# Patient Record
Sex: MALE | Race: BLACK OR AFRICAN AMERICAN | Employment: OTHER | URBAN - METROPOLITAN AREA
[De-identification: names, ages, dates, MRNs, and addresses within clinical notes are randomized per-mention and may not be internally consistent; named-entity substitution may affect disease eponyms.]

---

## 2018-01-19 ENCOUNTER — HOSPITAL ENCOUNTER (EMERGENCY)
Age: 63
Discharge: HOME | End: 2018-01-19
Attending: STUDENT IN AN ORGANIZED HEALTH CARE EDUCATION/TRAINING PROGRAM | Admitting: STUDENT IN AN ORGANIZED HEALTH CARE EDUCATION/TRAINING PROGRAM
Payer: MEDICARE

## 2018-01-19 ENCOUNTER — APPOINTMENT (OUTPATIENT)
Dept: CT IMAGING | Age: 63
End: 2018-01-19
Attending: STUDENT IN AN ORGANIZED HEALTH CARE EDUCATION/TRAINING PROGRAM
Payer: MEDICARE

## 2018-01-19 VITALS
HEART RATE: 92 BPM | HEIGHT: 68 IN | DIASTOLIC BLOOD PRESSURE: 99 MMHG | SYSTOLIC BLOOD PRESSURE: 141 MMHG | RESPIRATION RATE: 16 BRPM | OXYGEN SATURATION: 95 % | BODY MASS INDEX: 27.16 KG/M2 | WEIGHT: 179.2 LBS | TEMPERATURE: 97.6 F

## 2018-01-19 DIAGNOSIS — F32.A DEPRESSION, UNSPECIFIED DEPRESSION TYPE: Primary | ICD-10-CM

## 2018-01-19 LAB
ALBUMIN SERPL-MCNC: 3.6 G/DL (ref 3.5–5)
ALBUMIN/GLOB SERPL: 1.1 {RATIO} (ref 1.1–2.2)
ALP SERPL-CCNC: 63 U/L (ref 45–117)
ALT SERPL-CCNC: 31 U/L (ref 12–78)
AMPHET UR QL SCN: NEGATIVE
ANION GAP SERPL CALC-SCNC: 7 MMOL/L (ref 5–15)
APAP SERPL-MCNC: <2 UG/ML (ref 10–30)
APPEARANCE UR: CLEAR
AST SERPL-CCNC: 18 U/L (ref 15–37)
BACTERIA URNS QL MICRO: NEGATIVE /HPF
BARBITURATES UR QL SCN: NEGATIVE
BASOPHILS # BLD: 0 K/UL (ref 0–0.1)
BASOPHILS NFR BLD: 1 % (ref 0–1)
BENZODIAZ UR QL: NEGATIVE
BILIRUB SERPL-MCNC: 0.4 MG/DL (ref 0.2–1)
BILIRUB UR QL: NEGATIVE
BUN SERPL-MCNC: 6 MG/DL (ref 6–20)
BUN/CREAT SERPL: 7 (ref 12–20)
CALCIUM SERPL-MCNC: 8.9 MG/DL (ref 8.5–10.1)
CANNABINOIDS UR QL SCN: NEGATIVE
CHLORIDE SERPL-SCNC: 106 MMOL/L (ref 97–108)
CO2 SERPL-SCNC: 26 MMOL/L (ref 21–32)
COCAINE UR QL SCN: NEGATIVE
COLOR UR: NORMAL
CREAT SERPL-MCNC: 0.81 MG/DL (ref 0.7–1.3)
DRUG SCRN COMMENT,DRGCM: NORMAL
EOSINOPHIL # BLD: 0.1 K/UL (ref 0–0.4)
EOSINOPHIL NFR BLD: 1 % (ref 0–7)
EPITH CASTS URNS QL MICRO: NORMAL /LPF
ERYTHROCYTE [DISTWIDTH] IN BLOOD BY AUTOMATED COUNT: 14.1 % (ref 11.5–14.5)
ETHANOL SERPL-MCNC: <10 MG/DL
GLOBULIN SER CALC-MCNC: 3.2 G/DL (ref 2–4)
GLUCOSE SERPL-MCNC: 93 MG/DL (ref 65–100)
GLUCOSE UR STRIP.AUTO-MCNC: NEGATIVE MG/DL
HCT VFR BLD AUTO: 40.2 % (ref 36.6–50.3)
HGB BLD-MCNC: 13.5 G/DL (ref 12.1–17)
HGB UR QL STRIP: NEGATIVE
HYALINE CASTS URNS QL MICRO: NORMAL /LPF (ref 0–5)
KETONES UR QL STRIP.AUTO: NEGATIVE MG/DL
LEUKOCYTE ESTERASE UR QL STRIP.AUTO: NEGATIVE
LYMPHOCYTES # BLD: 2 K/UL (ref 0.8–3.5)
LYMPHOCYTES NFR BLD: 25 % (ref 12–49)
MCH RBC QN AUTO: 28.3 PG (ref 26–34)
MCHC RBC AUTO-ENTMCNC: 33.6 G/DL (ref 30–36.5)
MCV RBC AUTO: 84.3 FL (ref 80–99)
METHADONE UR QL: NEGATIVE
MONOCYTES # BLD: 0.7 K/UL (ref 0–1)
MONOCYTES NFR BLD: 8 % (ref 5–13)
NEUTS SEG # BLD: 5.3 K/UL (ref 1.8–8)
NEUTS SEG NFR BLD: 65 % (ref 32–75)
NITRITE UR QL STRIP.AUTO: NEGATIVE
OPIATES UR QL: NEGATIVE
PCP UR QL: NEGATIVE
PH UR STRIP: 7.5 [PH] (ref 5–8)
PLATELET # BLD AUTO: 205 K/UL (ref 150–400)
POTASSIUM SERPL-SCNC: 3.9 MMOL/L (ref 3.5–5.1)
PROT SERPL-MCNC: 6.8 G/DL (ref 6.4–8.2)
PROT UR STRIP-MCNC: NEGATIVE MG/DL
RBC # BLD AUTO: 4.77 M/UL (ref 4.1–5.7)
RBC #/AREA URNS HPF: NORMAL /HPF (ref 0–5)
SALICYLATES SERPL-MCNC: 2 MG/DL (ref 2.8–20)
SODIUM SERPL-SCNC: 139 MMOL/L (ref 136–145)
SP GR UR REFRACTOMETRY: 1.01 (ref 1–1.03)
T4 FREE SERPL-MCNC: 1 NG/DL (ref 0.8–1.5)
TROPONIN I SERPL-MCNC: <0.04 NG/ML
TSH SERPL DL<=0.05 MIU/L-ACNC: 1.06 UIU/ML (ref 0.36–3.74)
UR CULT HOLD, URHOLD: NORMAL
UROBILINOGEN UR QL STRIP.AUTO: 1 EU/DL (ref 0.2–1)
WBC # BLD AUTO: 8.1 K/UL (ref 4.1–11.1)
WBC URNS QL MICRO: NORMAL /HPF (ref 0–4)

## 2018-01-19 PROCEDURE — 90791 PSYCH DIAGNOSTIC EVALUATION: CPT

## 2018-01-19 PROCEDURE — 80307 DRUG TEST PRSMV CHEM ANLYZR: CPT | Performed by: EMERGENCY MEDICINE

## 2018-01-19 PROCEDURE — 85025 COMPLETE CBC W/AUTO DIFF WBC: CPT | Performed by: EMERGENCY MEDICINE

## 2018-01-19 PROCEDURE — 99284 EMERGENCY DEPT VISIT MOD MDM: CPT

## 2018-01-19 PROCEDURE — 80053 COMPREHEN METABOLIC PANEL: CPT | Performed by: EMERGENCY MEDICINE

## 2018-01-19 PROCEDURE — 36415 COLL VENOUS BLD VENIPUNCTURE: CPT | Performed by: EMERGENCY MEDICINE

## 2018-01-19 PROCEDURE — 84484 ASSAY OF TROPONIN QUANT: CPT | Performed by: STUDENT IN AN ORGANIZED HEALTH CARE EDUCATION/TRAINING PROGRAM

## 2018-01-19 PROCEDURE — 81001 URINALYSIS AUTO W/SCOPE: CPT | Performed by: EMERGENCY MEDICINE

## 2018-01-19 PROCEDURE — 84443 ASSAY THYROID STIM HORMONE: CPT | Performed by: STUDENT IN AN ORGANIZED HEALTH CARE EDUCATION/TRAINING PROGRAM

## 2018-01-19 PROCEDURE — 80307 DRUG TEST PRSMV CHEM ANLYZR: CPT | Performed by: STUDENT IN AN ORGANIZED HEALTH CARE EDUCATION/TRAINING PROGRAM

## 2018-01-19 PROCEDURE — 93005 ELECTROCARDIOGRAM TRACING: CPT

## 2018-01-19 PROCEDURE — 70450 CT HEAD/BRAIN W/O DYE: CPT

## 2018-01-19 PROCEDURE — 84439 ASSAY OF FREE THYROXINE: CPT | Performed by: STUDENT IN AN ORGANIZED HEALTH CARE EDUCATION/TRAINING PROGRAM
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
## 2018-01-20 LAB
ATRIAL RATE: 82 BPM
CALCULATED P AXIS, ECG09: 61 DEGREES
CALCULATED R AXIS, ECG10: -46 DEGREES
CALCULATED T AXIS, ECG11: 43 DEGREES
DIAGNOSIS, 93000: NORMAL
P-R INTERVAL, ECG05: 182 MS
Q-T INTERVAL, ECG07: 402 MS
QRS DURATION, ECG06: 134 MS
QTC CALCULATION (BEZET), ECG08: 469 MS
VENTRICULAR RATE, ECG03: 82 BPM

## 2018-01-20 NOTE — DISCHARGE INSTRUCTIONS
Recovering From Depression: Care Instructions  Your Care Instructions    Taking good care of yourself is important as you recover from depression. In time, your symptoms will fade as your treatment takes hold. Do not give up. Instead, focus your energy on getting better. Your mood will improve. It just takes some time. Focus on things that can help you feel better, such as being with friends and family, eating well, and getting enough rest. But take things slowly. Do not do too much too soon. You will begin to feel better gradually. Follow-up care is a key part of your treatment and safety. Be sure to make and go to all appointments, and call your doctor if you are having problems. It's also a good idea to know your test results and keep a list of the medicines you take. How can you care for yourself at home? Be realistic  · If you have a large task to do, break it up into smaller steps you can handle, and just do what you can. · You may want to put off important decisions until your depression has lifted. If you have plans that will have a major impact on your life, such as marriage, divorce, or a job change, try to wait a bit. Talk it over with friends and loved ones who can help you look at the overall picture first.  · Reaching out to people for help is important. Do not isolate yourself. Let your family and friends help you. Find someone you can trust and confide in, and talk to that person. · Be patient, and be kind to yourself. Remember that depression is not your fault and is not something you can overcome with willpower alone. Treatment is necessary for depression, just like for any other illness. Feeling better takes time, and your mood will improve little by little. Stay active  · Stay busy and get outside. Take a walk, or try some other light exercise. · Talk with your doctor about an exercise program. Exercise can help with mild depression. · Go to a movie or concert.  Take part in a Religious activity or other social gathering. Go to a [employer] game. · Ask a friend to have dinner with you. Take care of yourself  · Eat a balanced diet with plenty of fresh fruits and vegetables, whole grains, and lean protein. If you have lost your appetite, eat small snacks rather than large meals. · Avoid drinking alcohol or using illegal drugs. Do not take medicines that have not been prescribed for you. They may interfere with medicines you may be taking for depression, or they may make your depression worse. · Take your medicines exactly as they are prescribed. You may start to feel better within 1 to 3 weeks of taking antidepressant medicine. But it can take as many as 6 to 8 weeks to see more improvement. If you have questions or concerns about your medicines, or if you do not notice any improvement by 3 weeks, talk to your doctor. · If you have any side effects from your medicine, tell your doctor. Antidepressants can make you feel tired, dizzy, or nervous. Some people have dry mouth, constipation, headaches, sexual problems, or diarrhea. Many of these side effects are mild and will go away on their own after you have been taking the medicine for a few weeks. Some may last longer. Talk to your doctor if side effects are bothering you too much. You might be able to try a different medicine. · Get enough sleep. If you have problems sleeping:  ¨ Go to bed at the same time every night, and get up at the same time every morning. ¨ Keep your bedroom dark and quiet. ¨ Do not exercise after 5:00 p.m. ¨ Avoid drinks with caffeine after 5:00 p.m. · Avoid sleeping pills unless they are prescribed by the doctor treating your depression. Sleeping pills may make you groggy during the day, and they may interact with other medicine you are taking. · If you have any other illnesses, such as diabetes, heart disease, or high blood pressure, make sure to continue with your treatment.  Tell your doctor about all of the medicines you take, including those with or without a prescription. · Keep the numbers for these national suicide hotlines: 7-895-451-TALK (9-993.306.8928) and 1-956-ETZUTVF (1-752.562.4588). If you or someone you know talks about suicide or feeling hopeless, get help right away. When should you call for help? Call 911 anytime you think you may need emergency care. For example, call if:  ? · You feel like hurting yourself or someone else. ? · Someone you know has depression and is about to attempt or is attempting suicide. ?Call your doctor now or seek immediate medical care if:  ? · You hear voices. ? · Someone you know has depression and:  ¨ Starts to give away his or her possessions. ¨ Uses illegal drugs or drinks alcohol heavily. ¨ Talks or writes about death, including writing suicide notes or talking about guns, knives, or pills. ¨ Starts to spend a lot of time alone. ¨ Acts very aggressively or suddenly appears calm. ? Watch closely for changes in your health, and be sure to contact your doctor if:  ? · You do not get better as expected. Where can you learn more? Go to http://kahlil-jeramy.info/. Enter T665 in the search box to learn more about \"Recovering From Depression: Care Instructions. \"  Current as of: May 12, 2017  Content Version: 11.4  © 7256-0942 [employer]. Care instructions adapted under license by [employer] (which disclaims liability or warranty for this information). If you have questions about a medical condition or this instruction, always ask your healthcare professional. [address] any warranty or liability for your use of this information.

## 2018-01-20 NOTE — ED NOTES
Following conversation with [facility] from [facility], patient and family walked out of room and stated they were leaving. Attempted to remove patient's IV, but patient stated he pulled it out himself, and did not allow staff to see if IV was in place. Patient refused discharge vitals and discharge papers. Patient was discharged, and [facility] from [facility] reviewed her assessment. Patient and family did not allow for proper discharge procedures and review of discharge instructions.

## 2022-08-15 ENCOUNTER — APPOINTMENT (OUTPATIENT)
Dept: GENERAL RADIOLOGY | Age: 67
End: 2022-08-15
Attending: EMERGENCY MEDICINE
Payer: MEDICARE

## 2022-08-15 ENCOUNTER — HOSPITAL ENCOUNTER (EMERGENCY)
Age: 67
Discharge: HOME | End: 2022-08-15
Attending: EMERGENCY MEDICINE
Payer: MEDICARE

## 2022-08-15 VITALS
HEART RATE: 100 BPM | RESPIRATION RATE: 16 BRPM | DIASTOLIC BLOOD PRESSURE: 95 MMHG | OXYGEN SATURATION: 97 % | TEMPERATURE: 97.7 F | SYSTOLIC BLOOD PRESSURE: 138 MMHG

## 2022-08-15 DIAGNOSIS — R07.9 CHEST PAIN, UNSPECIFIED TYPE: Primary | ICD-10-CM

## 2022-08-15 LAB
ALBUMIN SERPL-MCNC: 3.1 G/DL (ref 3.5–5)
ALBUMIN/GLOB SERPL: 0.8 {RATIO} (ref 1.1–2.2)
ALP SERPL-CCNC: 104 U/L (ref 45–117)
ALT SERPL-CCNC: 31 U/L (ref 12–78)
ANION GAP SERPL CALC-SCNC: 5 MMOL/L (ref 5–15)
AST SERPL-CCNC: 20 U/L (ref 15–37)
ATRIAL RATE: 110 BPM
BASOPHILS # BLD: 0.1 K/UL (ref 0–0.1)
BASOPHILS NFR BLD: 1 % (ref 0–1)
BILIRUB SERPL-MCNC: 0.2 MG/DL (ref 0.2–1)
BUN SERPL-MCNC: 5 MG/DL (ref 6–20)
BUN/CREAT SERPL: 6 (ref 12–20)
CALCIUM SERPL-MCNC: 9.1 MG/DL (ref 8.5–10.1)
CALCULATED P AXIS, ECG09: 67 DEGREES
CALCULATED R AXIS, ECG10: -65 DEGREES
CALCULATED T AXIS, ECG11: 71 DEGREES
CHLORIDE SERPL-SCNC: 105 MMOL/L (ref 97–108)
CO2 SERPL-SCNC: 26 MMOL/L (ref 21–32)
COMMENT, HOLDF: NORMAL
CREAT SERPL-MCNC: 0.85 MG/DL (ref 0.7–1.3)
DIAGNOSIS, 93000: NORMAL
DIFFERENTIAL METHOD BLD: NORMAL
EOSINOPHIL # BLD: 0.2 K/UL (ref 0–0.4)
EOSINOPHIL NFR BLD: 3 % (ref 0–7)
ERYTHROCYTE [DISTWIDTH] IN BLOOD BY AUTOMATED COUNT: 12.7 % (ref 11.5–14.5)
GLOBULIN SER CALC-MCNC: 3.7 G/DL (ref 2–4)
GLUCOSE SERPL-MCNC: 130 MG/DL (ref 65–100)
HCT VFR BLD AUTO: 43.7 % (ref 36.6–50.3)
HGB BLD-MCNC: 15 G/DL (ref 12.1–17)
IMM GRANULOCYTES # BLD AUTO: 0 K/UL (ref 0–0.04)
IMM GRANULOCYTES NFR BLD AUTO: 0 % (ref 0–0.5)
LYMPHOCYTES # BLD: 2.3 K/UL (ref 0.8–3.5)
LYMPHOCYTES NFR BLD: 27 % (ref 12–49)
MCH RBC QN AUTO: 29.8 PG (ref 26–34)
MCHC RBC AUTO-ENTMCNC: 34.3 G/DL (ref 30–36.5)
MCV RBC AUTO: 86.9 FL (ref 80–99)
MONOCYTES # BLD: 0.7 K/UL (ref 0–1)
MONOCYTES NFR BLD: 8 % (ref 5–13)
NEUTS SEG # BLD: 5.1 K/UL (ref 1.8–8)
NEUTS SEG NFR BLD: 61 % (ref 32–75)
NRBC # BLD: 0 K/UL (ref 0–0.01)
NRBC BLD-RTO: 0 PER 100 WBC
P-R INTERVAL, ECG05: 160 MS
PLATELET # BLD AUTO: 301 K/UL (ref 150–400)
PMV BLD AUTO: 9.7 FL (ref 8.9–12.9)
POTASSIUM SERPL-SCNC: 4.3 MMOL/L (ref 3.5–5.1)
PROT SERPL-MCNC: 6.8 G/DL (ref 6.4–8.2)
Q-T INTERVAL, ECG07: 358 MS
QRS DURATION, ECG06: 122 MS
QTC CALCULATION (BEZET), ECG08: 484 MS
RBC # BLD AUTO: 5.03 M/UL (ref 4.1–5.7)
SAMPLES BEING HELD,HOLD: NORMAL
SODIUM SERPL-SCNC: 136 MMOL/L (ref 136–145)
TROPONIN-HIGH SENSITIVITY: 10 NG/L (ref 0–76)
TROPONIN-HIGH SENSITIVITY: 9 NG/L (ref 0–76)
VENTRICULAR RATE, ECG03: 110 BPM
WBC # BLD AUTO: 8.4 K/UL (ref 4.1–11.1)

## 2022-08-15 PROCEDURE — 85025 COMPLETE CBC W/AUTO DIFF WBC: CPT

## 2022-08-15 PROCEDURE — 80053 COMPREHEN METABOLIC PANEL: CPT

## 2022-08-15 PROCEDURE — 36415 COLL VENOUS BLD VENIPUNCTURE: CPT

## 2022-08-15 PROCEDURE — 99285 EMERGENCY DEPT VISIT HI MDM: CPT

## 2022-08-15 PROCEDURE — 71046 X-RAY EXAM CHEST 2 VIEWS: CPT

## 2022-08-15 PROCEDURE — 93005 ELECTROCARDIOGRAM TRACING: CPT

## 2022-08-15 PROCEDURE — 84484 ASSAY OF TROPONIN QUANT: CPT

## 2022-08-15 NOTE — ED TRIAGE NOTES
Patient in through triage with complaints of 2 episodes of sharp left sided chest pain while at home wo sob, n/v. EMS arrived and the patients symptoms had resolved itself.

## 2022-08-15 NOTE — ED PROVIDER NOTES
HPI         69y M with hx of bipolar here with chest pain. Started about 4 hours ago while watching TV. Is in the left chest. Doesn't radiate. No nausea or vomiting. No shortness of breath. Is not pleuritic. No hx of similar pain. Is not positional or exertional. No rash or skin changes. No injury or trauma. Pain is sharp in nature. No abdominal pain. No prior hx of cardiac problems.      Past Medical History:   Diagnosis Date    Bipolar 1 disorder ([facility]     Cervical spondylosis with myelopathy 5/10/2012    Chronic pain     Depression     DJD (degenerative joint disease)     Herpes     HX OTHER MEDICAL     Mood disorder (HCC)     Numbness and tingling in right hand     Psychiatric disorder     ETOH ; SCHIZOAFFECTIVE DISORDER    Psychiatric disorder     POSSIBLE DEMENTIA    Schizo affective schizophrenia ([facility]     Schizoaffective disorder, depressive type ([facility] 10/20/2013    Schizophrenia ([facility]     Sleep disorder        Past Surgical History:   Procedure Laterality Date    ENDOSCOPY, COLON, DIAGNOSTIC      2005    HX ORTHOPAEDIC           Family History:   Problem Relation Age of Onset    Cancer Father     Hypertension Paternal Aunt     Hypertension Paternal Uncle        Social History     Socioeconomic History    Marital status:      Spouse name: Not on file    Number of children: Not on file    Years of education: Not on file    Highest education level: Not on file   Occupational History    Not on file   Tobacco Use    Smoking status: Every Day     Packs/day: 1.00     Years: 40.00     Pack years: 40.00     Types: Cigarettes    Smokeless tobacco: Never   Substance and Sexual Activity    Alcohol use: No    Drug use: Yes     Types: Marijuana     Comment: last use \"a couple of months ago\"    Sexual activity: Yes     Partners: Female     Birth control/protection: None   Other Topics Concern    Not on file   Social History Narrative    Not on file     Social Determinants of Health     Financial Resource Strain: Not on file   Food Insecurity: Not on file   Transportation Needs: Not on file   Physical Activity: Not on file   Stress: Not on file   Social Connections: Not on file   Intimate Partner Violence: Not on file   Housing Stability: Not on file         ALLERGIES: Patient has no known allergies. Review of Systems  Review of Systems   Constitutional: (-) weight loss. HEENT: (-) stiff neck   Eyes: (-) discharge. Respiratory: (-) cough. Cardiovascular: (-) syncope. Gastrointestinal: (-) blood in stool. Genitourinary: (-) hematuria. Musculoskeletal: (-) myalgias. Neurological: (-) seizure. Skin: (-) petechiae  Lymph/Immunologic: (-) enlarged lymph nodes  All other systems reviewed and are negative. There were no vitals filed for this visit. Physical Exam Nursing note and vitals reviewed. Constitutional: oriented to person, place, and time. appears well-developed and well-nourished. No distress. Head: Normocephalic and atraumatic. Sclera anicteric  Nose: No rhinorrhea  Mouth/Throat: Oropharynx is clear and moist. Pharynx normal  Eyes: Conjunctivae are normal. Pupils are equal, round, and reactive to light. Right eye exhibits no discharge. Left eye exhibits no discharge. Neck: Painless normal range of motion. Neck supple. No LAD. Cardiovascular: Normal rate, regular rhythm, normal heart sounds and intact distal pulses. Exam reveals no gallop and no friction rub. No murmur heard. Pulmonary/Chest:  No respiratory distress. No wheezes. No rales. No rhonchi. No increased work of breathing. No accessory muscle use. Good air exchange throughout. Abdominal: soft, non-tender, no rebound or guarding. No hepatosplenomegaly. Normal bowel sounds throughout. Back: no tenderness to palpation, no deformities, no CVA tenderness  Extremities/Musculoskeletal: Normal range of motion. no tenderness. No edema. Distal extremities are neurovasc intact. Lymphadenopathy:   No adenopathy.    Neurological:  Alert and oriented to person, place, and time. Coordination normal. CN 2-12 intact. Motor and sensory function intact. Skin: Skin is warm and dry. No rash noted. No pallor. MDM  69y M here with chest pain. Doesn't sound cardiac but will need labs, ECG, CXR. Procedures    ED EKG interpretation:  Rhythm: sinus tachycardia; and regular . Rate (approx.): 110; Axis: normal; P wave: normal; QRS interval: normal ; ST/T wave: normal;  This EKG was interpreted by Sukhi Sales MD,ED Provider.

## 2022-09-30 ENCOUNTER — APPOINTMENT (OUTPATIENT)
Dept: CT IMAGING | Age: 67
End: 2022-09-30
Attending: STUDENT IN AN ORGANIZED HEALTH CARE EDUCATION/TRAINING PROGRAM
Payer: MEDICARE

## 2022-09-30 ENCOUNTER — HOSPITAL ENCOUNTER (OUTPATIENT)
Age: 67
Setting detail: OBSERVATION
Discharge: HOME | End: 2022-10-03
Attending: STUDENT IN AN ORGANIZED HEALTH CARE EDUCATION/TRAINING PROGRAM | Admitting: INTERNAL MEDICINE
Payer: MEDICARE

## 2022-09-30 ENCOUNTER — APPOINTMENT (OUTPATIENT)
Dept: GENERAL RADIOLOGY | Age: 67
End: 2022-09-30
Attending: STUDENT IN AN ORGANIZED HEALTH CARE EDUCATION/TRAINING PROGRAM
Payer: MEDICARE

## 2022-09-30 DIAGNOSIS — R55 SYNCOPE AND COLLAPSE: Primary | ICD-10-CM

## 2022-09-30 DIAGNOSIS — R07.9 CHEST PAIN, UNSPECIFIED TYPE: ICD-10-CM

## 2022-09-30 LAB
ALBUMIN SERPL-MCNC: 3.5 G/DL (ref 3.5–5)
ALBUMIN/GLOB SERPL: 1 {RATIO} (ref 1.1–2.2)
ALP SERPL-CCNC: 90 U/L (ref 45–117)
ALT SERPL-CCNC: 65 U/L (ref 12–78)
AMPHET UR QL SCN: NEGATIVE
ANION GAP SERPL CALC-SCNC: 8 MMOL/L (ref 5–15)
AST SERPL-CCNC: 35 U/L (ref 15–37)
ATRIAL RATE: 107 BPM
BARBITURATES UR QL SCN: NEGATIVE
BASOPHILS # BLD: 0 K/UL (ref 0–0.1)
BASOPHILS NFR BLD: 0 % (ref 0–1)
BENZODIAZ UR QL: NEGATIVE
BILIRUB SERPL-MCNC: 0.5 MG/DL (ref 0.2–1)
BNP SERPL-MCNC: 34 PG/ML
BUN SERPL-MCNC: 16 MG/DL (ref 6–20)
BUN/CREAT SERPL: 13 (ref 12–20)
CALCIUM SERPL-MCNC: 8.8 MG/DL (ref 8.5–10.1)
CALCULATED P AXIS, ECG09: 73 DEGREES
CALCULATED R AXIS, ECG10: -68 DEGREES
CALCULATED T AXIS, ECG11: 69 DEGREES
CANNABINOIDS UR QL SCN: NEGATIVE
CHLORIDE SERPL-SCNC: 103 MMOL/L (ref 97–108)
CO2 SERPL-SCNC: 22 MMOL/L (ref 21–32)
COCAINE UR QL SCN: NEGATIVE
CREAT SERPL-MCNC: 1.2 MG/DL (ref 0.7–1.3)
DIAGNOSIS, 93000: NORMAL
DIFFERENTIAL METHOD BLD: ABNORMAL
DRUG SCRN COMMENT,DRGCM: ABNORMAL
EOSINOPHIL # BLD: 0 K/UL (ref 0–0.4)
EOSINOPHIL NFR BLD: 0 % (ref 0–7)
ERYTHROCYTE [DISTWIDTH] IN BLOOD BY AUTOMATED COUNT: 13.4 % (ref 11.5–14.5)
GLOBULIN SER CALC-MCNC: 3.6 G/DL (ref 2–4)
GLUCOSE SERPL-MCNC: 174 MG/DL (ref 65–100)
HCT VFR BLD AUTO: 43.9 % (ref 36.6–50.3)
HGB BLD-MCNC: 14.5 G/DL (ref 12.1–17)
IMM GRANULOCYTES # BLD AUTO: 0.1 K/UL (ref 0–0.04)
IMM GRANULOCYTES NFR BLD AUTO: 1 % (ref 0–0.5)
LYMPHOCYTES # BLD: 4.6 K/UL (ref 0.8–3.5)
LYMPHOCYTES NFR BLD: 29 % (ref 12–49)
MCH RBC QN AUTO: 28.2 PG (ref 26–34)
MCHC RBC AUTO-ENTMCNC: 33 G/DL (ref 30–36.5)
MCV RBC AUTO: 85.2 FL (ref 80–99)
METHADONE UR QL: NEGATIVE
MONOCYTES # BLD: 1.6 K/UL (ref 0–1)
MONOCYTES NFR BLD: 10 % (ref 5–13)
NEUTS SEG # BLD: 9.7 K/UL (ref 1.8–8)
NEUTS SEG NFR BLD: 60 % (ref 32–75)
NRBC # BLD: 0 K/UL (ref 0–0.01)
NRBC BLD-RTO: 0 PER 100 WBC
OPIATES UR QL: POSITIVE
P-R INTERVAL, ECG05: 162 MS
PCP UR QL: NEGATIVE
PLATELET # BLD AUTO: 287 K/UL (ref 150–400)
PMV BLD AUTO: 10.2 FL (ref 8.9–12.9)
POTASSIUM SERPL-SCNC: 4.7 MMOL/L (ref 3.5–5.1)
PROT SERPL-MCNC: 7.1 G/DL (ref 6.4–8.2)
Q-T INTERVAL, ECG07: 372 MS
QRS DURATION, ECG06: 134 MS
QTC CALCULATION (BEZET), ECG08: 496 MS
RBC # BLD AUTO: 5.15 M/UL (ref 4.1–5.7)
SODIUM SERPL-SCNC: 133 MMOL/L (ref 136–145)
TROPONIN-HIGH SENSITIVITY: 6 NG/L (ref 0–76)
TROPONIN-HIGH SENSITIVITY: 8 NG/L (ref 0–76)
VENTRICULAR RATE, ECG03: 107 BPM
WBC # BLD AUTO: 15.9 K/UL (ref 4.1–11.1)

## 2022-09-30 PROCEDURE — 99285 EMERGENCY DEPT VISIT HI MDM: CPT

## 2022-09-30 PROCEDURE — 74011250636 HC RX REV CODE- 250/636: Performed by: STUDENT IN AN ORGANIZED HEALTH CARE EDUCATION/TRAINING PROGRAM

## 2022-09-30 PROCEDURE — 80307 DRUG TEST PRSMV CHEM ANLYZR: CPT

## 2022-09-30 PROCEDURE — 74011000636 HC RX REV CODE- 636: Performed by: STUDENT IN AN ORGANIZED HEALTH CARE EDUCATION/TRAINING PROGRAM

## 2022-09-30 PROCEDURE — 80053 COMPREHEN METABOLIC PANEL: CPT

## 2022-09-30 PROCEDURE — 71275 CT ANGIOGRAPHY CHEST: CPT

## 2022-09-30 PROCEDURE — 74011250637 HC RX REV CODE- 250/637: Performed by: GENERAL ACUTE CARE HOSPITAL

## 2022-09-30 PROCEDURE — 36415 COLL VENOUS BLD VENIPUNCTURE: CPT

## 2022-09-30 PROCEDURE — 83880 ASSAY OF NATRIURETIC PEPTIDE: CPT

## 2022-09-30 PROCEDURE — 70450 CT HEAD/BRAIN W/O DYE: CPT

## 2022-09-30 PROCEDURE — 96374 THER/PROPH/DIAG INJ IV PUSH: CPT

## 2022-09-30 PROCEDURE — 93005 ELECTROCARDIOGRAM TRACING: CPT

## 2022-09-30 PROCEDURE — 65270000046 HC RM TELEMETRY

## 2022-09-30 PROCEDURE — 85025 COMPLETE CBC W/AUTO DIFF WBC: CPT

## 2022-09-30 PROCEDURE — 65390000012 HC CONDITION CODE 44 OBSERVATION

## 2022-09-30 PROCEDURE — 84484 ASSAY OF TROPONIN QUANT: CPT

## 2022-09-30 PROCEDURE — 71045 X-RAY EXAM CHEST 1 VIEW: CPT

## 2022-09-30 RX ORDER — IBUPROFEN 200 MG
1 TABLET ORAL DAILY
Status: DISCONTINUED | OUTPATIENT
Start: 2022-10-01 | End: 2022-10-03 | Stop reason: HOSPADM

## 2022-09-30 RX ORDER — ONDANSETRON 2 MG/ML
4 INJECTION INTRAMUSCULAR; INTRAVENOUS
Status: DISCONTINUED | OUTPATIENT
Start: 2022-09-30 | End: 2022-10-03 | Stop reason: HOSPADM

## 2022-09-30 RX ORDER — POLYETHYLENE GLYCOL 3350 17 G/17G
17 POWDER, FOR SOLUTION ORAL DAILY PRN
Status: DISCONTINUED | OUTPATIENT
Start: 2022-09-30 | End: 2022-10-03 | Stop reason: HOSPADM

## 2022-09-30 RX ORDER — ACETAMINOPHEN 325 MG/1
650 TABLET ORAL
Status: DISCONTINUED | OUTPATIENT
Start: 2022-09-30 | End: 2022-10-03 | Stop reason: HOSPADM

## 2022-09-30 RX ORDER — FLUOXETINE HYDROCHLORIDE 20 MG/1
40 CAPSULE ORAL DAILY
Status: DISCONTINUED | OUTPATIENT
Start: 2022-10-01 | End: 2022-10-03 | Stop reason: HOSPADM

## 2022-09-30 RX ORDER — MIRTAZAPINE 15 MG/1
15 TABLET, FILM COATED ORAL
Status: DISCONTINUED | OUTPATIENT
Start: 2022-09-30 | End: 2022-10-03 | Stop reason: HOSPADM

## 2022-09-30 RX ORDER — SODIUM CHLORIDE 0.9 % (FLUSH) 0.9 %
5-40 SYRINGE (ML) INJECTION EVERY 8 HOURS
Status: DISCONTINUED | OUTPATIENT
Start: 2022-09-30 | End: 2022-10-03 | Stop reason: HOSPADM

## 2022-09-30 RX ORDER — SODIUM CHLORIDE 0.9 % (FLUSH) 0.9 %
5-40 SYRINGE (ML) INJECTION AS NEEDED
Status: DISCONTINUED | OUTPATIENT
Start: 2022-09-30 | End: 2022-10-03 | Stop reason: HOSPADM

## 2022-09-30 RX ORDER — ONDANSETRON 2 MG/ML
4 INJECTION INTRAMUSCULAR; INTRAVENOUS ONCE
Status: COMPLETED | OUTPATIENT
Start: 2022-09-30 | End: 2022-09-30

## 2022-09-30 RX ORDER — ONDANSETRON 4 MG/1
4 TABLET, ORALLY DISINTEGRATING ORAL
Status: DISCONTINUED | OUTPATIENT
Start: 2022-09-30 | End: 2022-10-03 | Stop reason: HOSPADM

## 2022-09-30 RX ORDER — BUSPIRONE HYDROCHLORIDE 10 MG/1
10 TABLET ORAL 3 TIMES DAILY
Status: DISCONTINUED | OUTPATIENT
Start: 2022-09-30 | End: 2022-10-03 | Stop reason: HOSPADM

## 2022-09-30 RX ORDER — ENOXAPARIN SODIUM 100 MG/ML
40 INJECTION SUBCUTANEOUS DAILY
Status: DISCONTINUED | OUTPATIENT
Start: 2022-10-01 | End: 2022-10-03 | Stop reason: HOSPADM

## 2022-09-30 RX ORDER — ARIPIPRAZOLE 5 MG/1
5 TABLET ORAL DAILY
Status: DISCONTINUED | OUTPATIENT
Start: 2022-10-01 | End: 2022-10-03 | Stop reason: HOSPADM

## 2022-09-30 RX ORDER — ACETAMINOPHEN 650 MG/1
650 SUPPOSITORY RECTAL
Status: DISCONTINUED | OUTPATIENT
Start: 2022-09-30 | End: 2022-10-03 | Stop reason: HOSPADM

## 2022-09-30 RX ADMIN — IOPAMIDOL 80 ML: 755 INJECTION, SOLUTION INTRAVENOUS at 13:14

## 2022-09-30 RX ADMIN — BUSPIRONE HYDROCHLORIDE 10 MG: 10 TABLET ORAL at 23:50

## 2022-09-30 RX ADMIN — ONDANSETRON 4 MG: 2 INJECTION INTRAMUSCULAR; INTRAVENOUS at 11:33

## 2022-09-30 RX ADMIN — MIRTAZAPINE 15 MG: 15 TABLET, FILM COATED ORAL at 23:50

## 2022-09-30 NOTE — ED PROVIDER NOTES
EMERGENCY DEPARTMENT HISTORY AND PHYSICAL EXAM      Date: 9/30/2022  Patient Name: Marjan France    History of Presenting Illness     Chief Complaint   Patient presents with    Dizziness       History Provided By: Patient    HPI: Marjan France, 75 y.o. male presents to the ED with cc of syncopal episode this AM. Patient reports that he was at work this morning, smoked a cigarette and then started feeling bad, on EMS arrival they state that he had a syncopal episode and passed out. Ems states that he did not hit his head because they were able to catch him. They state that they then started bagging patient as he had no gag reflex, they administered narcan with no effect then administered 4mg intranasal narcan with improvement after 5-10 minutes. Patient denying any complaints. He is denying any ingestions. He states he has a remote history of cocaine abuse but denies any recently. He has long psychiatric history but states he has bene taking his medications as prescribed. There are no other complaints, changes, or physical findings at this time. PCP: Colt Rodriguez MD    No current facility-administered medications on file prior to encounter. Current Outpatient Medications on File Prior to Encounter   Medication Sig Dispense Refill    mometasone (ELOCON) 0.1 % ointment Apply  to affected area daily. Apply to affected area on body daily for maximum of 2 weeks 45 g 0    triamcinolone acetonide (KENALOG) 0.1 % topical cream Apply  to affected area two (2) times a day. Apply to affected area on face twice daily for maximum of 2 weeks 15 g 0    losartan (COZAAR) 50 mg tablet Take 1 Tablet by mouth daily. 90 Tablet 4    amLODIPine (NORVASC) 2.5 mg tablet TAKE 1 TABLET EVERY DAY 90 Tablet 3    busPIRone (BUSPAR) 10 mg tablet Take 1 Tab by mouth three (3) times daily. 180 Tab 3    FLUoxetine (PROZAC) 40 mg capsule Take 1 Cap by mouth daily. 90 Cap 3    mirtazapine (REMERON) 15 mg tablet Take 1 Tab by mouth nightly. 90 Tab 3    sildenafil citrate (VIAGRA) 50 mg tablet Take 1 Tab by mouth as needed (prn impotence). 15 Tab 3    ARIPiprazole (ABILIFY) 5 mg tablet Take  by mouth daily. Past History     Past Medical History:  Past Medical History:   Diagnosis Date    Bipolar 1 disorder ([facility]     Cervical spondylosis with myelopathy 5/10/2012    Chronic pain     Depression     DJD (degenerative joint disease)     Herpes     HX OTHER MEDICAL     Mood disorder (HCC)     Numbness and tingling in right hand     Psychiatric disorder     ETOH ; SCHIZOAFFECTIVE DISORDER    Psychiatric disorder     POSSIBLE DEMENTIA    Schizo affective schizophrenia ([facility]     Schizoaffective disorder, depressive type ([facility] 10/20/2013    Schizophrenia ([facility]     Sleep disorder        Past Surgical History:  Past Surgical History:   Procedure Laterality Date    ENDOSCOPY, COLON, DIAGNOSTIC      2005    HX ORTHOPAEDIC         Family History:  Family History   Problem Relation Age of Onset    Cancer Father     Hypertension Paternal Aunt     Hypertension Paternal Uncle        Social History:  Social History     Tobacco Use    Smoking status: Every Day     Packs/day: 1.00     Years: 40.00     Pack years: 40.00     Types: Cigarettes    Smokeless tobacco: Never   Substance Use Topics    Alcohol use: No    Drug use: Yes     Types: Marijuana     Comment: last use \"a couple of months ago\"       Allergies:  No Known Allergies      Review of Systems   Review of Systems   Constitutional:  Negative for chills and fever. HENT:  Negative for sore throat. Respiratory:  Negative for shortness of breath. Cardiovascular:  Negative for chest pain and leg swelling. Gastrointestinal:  Negative for abdominal pain, diarrhea, nausea and vomiting. Genitourinary:  Negative for difficulty urinating and hematuria. Musculoskeletal:  Negative for back pain. Neurological:  Positive for syncope. Negative for dizziness and light-headedness.      Physical Exam   Physical Exam  Vitals and nursing note reviewed. Constitutional:       Appearance: Normal appearance. HENT:      Head: Normocephalic and atraumatic. Eyes:      Conjunctiva/sclera: Conjunctivae normal.   Cardiovascular:      Rate and Rhythm: Normal rate and regular rhythm. Pulses: Normal pulses. Pulmonary:      Effort: Pulmonary effort is normal.      Breath sounds: Normal breath sounds. Abdominal:      General: Abdomen is flat. Palpations: Abdomen is soft. Musculoskeletal:      Cervical back: Neck supple. Skin:     General: Skin is warm. Neurological:      General: No focal deficit present. Mental Status: He is alert. Psychiatric:         Mood and Affect: Mood normal.         Behavior: Behavior normal.       Diagnostic Study Results     Labs -     Recent Results (from the past 12 hour(s))   CBC WITH AUTOMATED DIFF    Collection Time: 09/30/22 11:07 AM   Result Value Ref Range    WBC 15.9 (H) 4.1 - 11.1 K/uL    RBC 5.15 4.10 - 5.70 M/uL    HGB 14.5 12.1 - 17.0 g/dL    HCT 43.9 36.6 - 50.3 %    MCV 85.2 80.0 - 99.0 FL    MCH 28.2 26.0 - 34.0 PG    MCHC 33.0 30.0 - 36.5 g/dL    RDW 13.4 11.5 - 14.5 %    PLATELET 940 165 - 967 K/uL    MPV 10.2 8.9 - 12.9 FL    NRBC 0.0 0  WBC    ABSOLUTE NRBC 0.00 0.00 - 0.01 K/uL    NEUTROPHILS 60 32 - 75 %    LYMPHOCYTES 29 12 - 49 %    MONOCYTES 10 5 - 13 %    EOSINOPHILS 0 0 - 7 %    BASOPHILS 0 0 - 1 %    IMMATURE GRANULOCYTES 1 (H) 0.0 - 0.5 %    ABS. NEUTROPHILS 9.7 (H) 1.8 - 8.0 K/UL    ABS. LYMPHOCYTES 4.6 (H) 0.8 - 3.5 K/UL    ABS. MONOCYTES 1.6 (H) 0.0 - 1.0 K/UL    ABS. EOSINOPHILS 0.0 0.0 - 0.4 K/UL    ABS. BASOPHILS 0.0 0.0 - 0.1 K/UL    ABS. IMM.  GRANS. 0.1 (H) 0.00 - 0.04 K/UL    DF AUTOMATED     METABOLIC PANEL, COMPREHENSIVE    Collection Time: 09/30/22 11:07 AM   Result Value Ref Range    Sodium 133 (L) 136 - 145 mmol/L    Potassium 4.7 3.5 - 5.1 mmol/L    Chloride 103 97 - 108 mmol/L    CO2 22 21 - 32 mmol/L    Anion gap 8 5 - 15 mmol/L Glucose 174 (H) 65 - 100 mg/dL    BUN 16 6 - 20 MG/DL    Creatinine 1.20 0.70 - 1.30 MG/DL    BUN/Creatinine ratio 13 12 - 20      GFR est AA >60 >60 ml/min/1.73m2    GFR est non-AA >60 >60 ml/min/1.73m2    Calcium 8.8 8.5 - 10.1 MG/DL    Bilirubin, total 0.5 0.2 - 1.0 MG/DL    ALT (SGPT) 65 12 - 78 U/L    AST (SGOT) 35 15 - 37 U/L    Alk. phosphatase 90 45 - 117 U/L    Protein, total 7.1 6.4 - 8.2 g/dL    Albumin 3.5 3.5 - 5.0 g/dL    Globulin 3.6 2.0 - 4.0 g/dL    A-G Ratio 1.0 (L) 1.1 - 2.2     NT-PRO BNP    Collection Time: 09/30/22 11:07 AM   Result Value Ref Range    NT pro-BNP 34 <125 PG/ML   TROPONIN-HIGH SENSITIVITY    Collection Time: 09/30/22 11:07 AM   Result Value Ref Range    Troponin-High Sensitivity 6 0 - 76 ng/L   TROPONIN-HIGH SENSITIVITY    Collection Time: 09/30/22  1:43 PM   Result Value Ref Range    Troponin-High Sensitivity 8 0 - 76 ng/L       Radiologic Studies -   CTA CHEST W OR W WO CONT   Final Result   No pulmonary embolus or other acute cardiopulmonary process. .      XR CHEST PORT   Final Result   Persistent right basilar scar versus subsegmental atelectasis. CT HEAD WO CONT   Final Result   No acute intracranial process seen        CT Results  (Last 48 hours)                 09/30/22 1314  CTA CHEST W OR W WO CONT Final result    Impression:  No pulmonary embolus or other acute cardiopulmonary process. .       Narrative:  EXAM: CTA CHEST W OR W WO CONT       INDICATION: syncope       COMPARISON: None. CONTRAST: 80 mL of Isovue-370. TECHNIQUE:    Precontrast  images were obtained to localize the volume for acquisition. Multislice helical CT arteriography was performed from the diaphragm to the   thoracic inlet during uneventful rapid bolus intravenous contrast   administration. Lung and soft tissue windows were generated. Coronal and   sagittal images were generated and 3D post processing consisting of coronal   maximum intensity images was performed.   CT dose reduction was achieved through   use of a standardized protocol tailored for this examination and automatic   exposure control for dose modulation. FINDINGS:   The lungs show dependent atelectasis but otherwise are clear of mass, nodule,   airspace disease or edema. The pulmonary arteries are well enhanced and no pulmonary emboli are identified. There is no mediastinal or hilar adenopathy or mass. The aorta enhances normally   without evidence of aneurysm or dissection. The visualized portions of the upper abdominal organs are normal. Osseous   structures show degenerative spine change with partial visualization of lower   cervical ACDF plate and screws. 09/30/22 1126  CT HEAD WO CONT Final result    Impression:  No acute intracranial process seen       Narrative:  EXAM: CT HEAD WO CONT       INDICATION: syncope       COMPARISON: 1/19/2018. CONTRAST: None. TECHNIQUE: Unenhanced CT of the head was performed using 5 mm images. Brain and   bone windows were generated. Coronal and sagittal reformats. CT dose reduction   was achieved through use of a standardized protocol tailored for this   examination and automatic exposure control for dose modulation. FINDINGS:   The ventricles and sulci are normal in size, shape and configuration. . There is   no significant white matter disease. There is no intracranial hemorrhage,   extra-axial collection, or mass effect. The basilar cisterns are open. No CT   evidence of acute infarct. Heavy bilateral basal ganglia calcification is seen. The bone windows demonstrate no abnormalities. The visualized portions of the   paranasal sinuses and mastoid air cells are clear. CXR Results  (Last 48 hours)                 09/30/22 1145  XR CHEST PORT Final result    Impression:  Persistent right basilar scar versus subsegmental atelectasis.        Narrative:  EXAM: XR CHEST PORT       INDICATION: syncope COMPARISON: 8/15/2022       FINDINGS: A portable AP radiograph of the chest was obtained at 1140 hours. The   patient is on a cardiac monitor. There is a linear opacity at the right lung   base. . The cardiac and mediastinal contours and pulmonary vascularity are   normal.  The bones and soft tissues are grossly within normal limits. Medical Decision Making   I am the first provider for this patient. I reviewed the vital signs, available nursing notes, past medical history, past surgical history, family history and social history. Vital Signs-Reviewed the patient's vital signs. Patient Vitals for the past 12 hrs:   Pulse Resp BP SpO2   09/30/22 1250 85 12 110/80 97 %   09/30/22 1235 82 8 95/65 95 %   09/30/22 1220 76 8 109/72 95 %   09/30/22 1205 86 9 120/81 98 %   09/30/22 1145 93 17 112/74 97 %   09/30/22 1135 98 15 118/75 96 %   09/30/22 1115 (!) 109 19 133/82 99 %   09/30/22 1105 -- -- -- 99 %   09/30/22 1100 (!) 112 23 133/86 96 %       EKG interpretation: (Preliminary)  Sinus tachycardia, rate 107, RBBB, L anterior fasciular block, no significant ST elevations or depressions from previous 08/2022    Records Reviewed: Nursing Notes, Previous Radiology Studies, and Previous Laboratory Studies    Provider Notes (Medical Decision Making):   Patient presenting with syncopal episode. On exam he is hemodynamically stable, tachycardic but vital signs otherwise stable. No complaints of chest pain or dyspnea, no complaints of headache, did not hit his head, not on blood thinners. Differentials include electrolyte abnormality versus anemia versus opioid ingestion versus cardiac syncope, EKG is abnormal but unchanged from August 2022. No previous cardiac work up. Adamantly denying any drug use today. ED Course:   Initial assessment performed. The patients presenting problems have been discussed, and they are in agreement with the care plan formulated and outlined with them.   I have encouraged them to ask questions as they arise throughout their visit. Patient re-evaluated with sister at bedside. He is strongly denying any opioid use. Does report he has been having intermittent chest pain and palpitations, no previous syncopal events. It is possible his syncopal episode was related to cocaine use w/ opioid co-ingestion however given significant delay in return to baseline from narcan as well as initial lack of improvement it is possible this is cardiac vs arrhythmia induced syncope rather than associated with drug use. Patient does have an abnormal EKG with no previous cardiac work up. Will discuss with hospitalist for syncope work up. Disposition:    Admitted to Floor Medical Floor the case was discussed with the admitting physician Dr. Tomy Phillips    Diagnosis     Clinical Impression:   1. Syncope and collapse    2. Chest pain, unspecified type        Attestations:    Yessenia Joseph, DO        Please note that this dictation was completed with [employer], the computer voice recognition software. Quite often unanticipated grammatical, syntax, homophones, and other interpretive errors are inadvertently transcribed by the computer software. Please disregard these errors. Please excuse any errors that have escaped final proofreading. Thank you.

## 2022-09-30 NOTE — H&P
Hospitalist Admission Note    NAME: Patricio Roberts   :  1955   MRN:  [id number]     Date/Time:  2022 1:31 PM    Patient PCP: Ulices Beal MD  ______________________________________________________________________  Given the patient's current clinical presentation, I have a high level of concern for decompensation if discharged from the emergency department. Complex decision making was performed, which includes reviewing the patient's available past medical records, laboratory results, and x-ray films. My assessment of this patient's clinical condition and my plan of care is as follows. Assessment / Plan:    Syncope  Episodes of chest pain  H/o HTN  Ct head neg. CTA chest neg  Tele monitor  BP soft, hold BP meds  Orthostatic vitals  TSH  Urine drug screen  ECHO  Cardiology consult  TSH  Consider stress test    Leukocytosis   Pt asymptomatic   Likely reactive  CXR clear  Will check UA  Repeat CBC in AM    Bipolar  Schizophrenia  Resume home meds  Pt stated that 2 of his meds were recently changes, not sure which one     Code Status: full   Surrogate Decision Maker: sister  DVT Prophylaxis: lovenox  GI Prophylaxis: not indicated  Baseline: independent       Subjective:   CHIEF COMPLAINT: syncope     HISTORY OF PRESENT ILLNESS:     Patricio Roberts, 75 y.o. male presents to the ED with cc of syncopal episode this AM. Patient reports that he was at work this morning, smoked a cigarette and then started feeling bad and started having substernal chest pain and does not remember what happened afterwards till he woke up in the ambulance. on EMS arrival they state that he had a syncopal episode and passed out. Ems states that he did not hit his head because they were able to catch him. They state that they then started bagging patient as he had no gag reflex, they administered narcan with no effect then administered 4mg intranasal narcan with improvement after 5-10 minutes.       Pt reports having 2 chest pain episodes in the past 6 months, but no prior syncope. And he though these chest pain episodes are related to his anxiety. Pt denies tongue bite, loss of control of bowel/bladder, HA, focal weakness/numbness. Denies LOC, h/o seizures, fever, change in bowel/bladder habits, SOB. Pt denies alcohol abuse. He is denying any ingestions. He states he has a remote history of cocaine abuse but denies any recently. He smokes marijuana, not that frequently. Patient denying any complaints. He has long psychiatric history but states he has bene taking his medications as prescribed, he did report that 2 of his med doses were changed 2 weeks ago. CT HEAD WO CONT    Result Date: 9/30/2022  No acute intracranial process seen    XR CHEST PORT    Result Date: 9/30/2022  Persistent right basilar scar versus subsegmental atelectasis. We were asked to admit for work up and evaluation of the above problems.      Past Medical History:   Diagnosis Date    Bipolar 1 disorder ([address]     Cervical spondylosis with myelopathy 5/10/2012    Chronic pain     Depression     DJD (degenerative joint disease)     Herpes     HX OTHER MEDICAL     Mood disorder (HCC)     Numbness and tingling in right hand     Psychiatric disorder     ETOH ; SCHIZOAFFECTIVE DISORDER    Psychiatric disorder     POSSIBLE DEMENTIA    Schizo affective schizophrenia ([address]     Schizoaffective disorder, depressive type ([address] 10/20/2013    Schizophrenia ([address]     Sleep disorder         Past Surgical History:   Procedure Laterality Date    ENDOSCOPY, COLON, DIAGNOSTIC      2005    HX ORTHOPAEDIC         Social History     Tobacco Use    Smoking status: Every Day     Packs/day: 1.00     Years: 40.00     Pack years: 40.00     Types: Cigarettes    Smokeless tobacco: Never   Substance Use Topics    Alcohol use: No        Family History   Problem Relation Age of Onset    Cancer Father     Hypertension Paternal Aunt     Hypertension Paternal Uncle No Known Allergies     Prior to Admission medications    Medication Sig Start Date End Date Taking? Authorizing Provider   mometasone (ELOCON) 0.1 % ointment Apply  to affected area daily. Apply to affected area on body daily for maximum of 2 weeks 9/16/22   Maria A Arvizu MD   triamcinolone acetonide (KENALOG) 0.1 % topical cream Apply  to affected area two (2) times a day. Apply to affected area on face twice daily for maximum of 2 weeks 9/16/22   Maria A Arvizu MD   losartan (COZAAR) 50 mg tablet Take 1 Tablet by mouth daily. 9/16/22   Maria A Arvizu MD   amLODIPine (NORVASC) 2.5 mg tablet TAKE 1 TABLET EVERY DAY 7/8/22   Ulices Beal MD   busPIRone (BUSPAR) 10 mg tablet Take 1 Tab by mouth three (3) times daily. 6/21/19   Ulices Beal MD   FLUoxetine (PROZAC) 40 mg capsule Take 1 Cap by mouth daily. 6/21/19   Ulices Beal MD   mirtazapine (REMERON) 15 mg tablet Take 1 Tab by mouth nightly. 6/21/19   Ulices Beal MD   sildenafil citrate (VIAGRA) 50 mg tablet Take 1 Tab by mouth as needed (prn impotence). 3/18/19   Ulices Beal MD   ARIPiprazole (ABILIFY) 5 mg tablet Take  by mouth daily. Provider, Historical       REVIEW OF SYSTEMS:     Total of 12 systems reviewed, negative except for the above. I am not able to complete the review of systems because:    The patient is intubated and sedated    The patient has altered mental status due to his acute medical problems    The patient has baseline aphasia from prior stroke(s)    The patient has baseline dementia and is not reliable historian    The patient is in acute medical distress and unable to provide information             POSITIVE= underlined text  Negative = text not underlined  General:  fever, chills, sweats, generalized weakness, weight loss/gain,      loss of appetite   Eyes:    blurred vision, eye pain, loss of vision, double vision  ENT:    rhinorrhea, pharyngitis   Respiratory:   cough, sputum production, SOB, REDDING, wheezing, pleuritic pain   Cardiology:   chest pain, palpitations, orthopnea, PND, edema, syncope   Gastrointestinal:  abdominal pain , N/V, diarrhea, dysphagia, constipation, bleeding   Genitourinary:  frequency, urgency, dysuria, hematuria, incontinence   Muskuloskeletal :  arthralgia, myalgia, back pain  Hematology:  easy bruising, nose or gum bleeding, lymphadenopathy   Dermatological: rash, ulceration, pruritis, color change / jaundice  Endocrine:   hot flashes or polydipsia   Neurological:  headache, dizziness, confusion, focal weakness, paresthesia,     Speech difficulties, memory loss, gait difficulty  Psychological: Feelings of anxiety, depression, agitation    Objective:   VITALS:    Visit Vitals  /80   Pulse 85   Resp 12   Ht 5' 8\" (1.727 m)   Wt 82.6 kg (182 lb)   SpO2 97%   BMI 27.67 kg/m²     No intake or output data in the 24 hours ending 09/30/22 1331   Wt Readings from Last 10 Encounters:   09/30/22 82.6 kg (182 lb)   09/16/22 82.6 kg (182 lb)   03/18/19 82.6 kg (182 lb)   01/15/19 77.1 kg (170 lb)   01/19/18 81.3 kg (179 lb 3.2 oz)   11/06/17 81.6 kg (180 lb)   11/21/16 89.4 kg (197 lb)   10/23/16 88 kg (194 lb)   09/16/15 88.5 kg (195 lb)   09/10/14 126.1 kg (278 lb)       PHYSICAL EXAM:    General:    Alert, cooperative, no distress, appears stated age. HEENT: Atraumatic, anicteric sclerae, pink conjunctivae, MMM  Neck:  Supple, symmetrical  Lungs:   CTA. No Wheezing/Rhonchi. No rales. No tenderness  No Accessory muscle use. CVS:   Regular rhythm. No murmur. No JVD   GI/:   Soft, NT. ND.  BS normal  Extremities: No edema. No cyanosis. No clubbing. Skin:     Not pale. Not Jaundiced. No rashes   Psych:  Good insight. Not depressed. Not anxious or agitated. Neurologic: Alert and oriented X 4. EOMs intact. No facial asymmetry. No slurred speech.  Symmetrical strength, Sensation grossly intact.     _______________________________________________________________________  Care Plan discussed with:    Comments   Patient x    Family  x    RN     Care Manager                    Consultant:      _______________________________________________________________________  Expected  Disposition:   Home with Family x   HH/PT/OT/RN    SNF/LTC    [facility]    ________________________________________________________________________  TOTAL TIME: 54 Minutes    Critical Care Provided     Minutes non procedure based      Comments    x Reviewed previous records   >50% of visit spent in counseling and coordination of care x Discussion with patient and/or family and questions answered       ________________________________________________________________________  Signed: Burnard Goltz, MD    Procedures: see electronic medical records for all procedures/Xrays and details which were not copied into this note but were reviewed prior to creation of Plan. LAB DATA REVIEWED:    Recent Results (from the past 24 hour(s))   CBC WITH AUTOMATED DIFF    Collection Time: 09/30/22 11:07 AM   Result Value Ref Range    WBC 15.9 (H) 4.1 - 11.1 K/uL    RBC 5.15 4.10 - 5.70 M/uL    HGB 14.5 12.1 - 17.0 g/dL    HCT 43.9 36.6 - 50.3 %    MCV 85.2 80.0 - 99.0 FL    MCH 28.2 26.0 - 34.0 PG    MCHC 33.0 30.0 - 36.5 g/dL    RDW 13.4 11.5 - 14.5 %    PLATELET 844 884 - 922 K/uL    MPV 10.2 8.9 - 12.9 FL    NRBC 0.0 0  WBC    ABSOLUTE NRBC 0.00 0.00 - 0.01 K/uL    NEUTROPHILS 60 32 - 75 %    LYMPHOCYTES 29 12 - 49 %    MONOCYTES 10 5 - 13 %    EOSINOPHILS 0 0 - 7 %    BASOPHILS 0 0 - 1 %    IMMATURE GRANULOCYTES 1 (H) 0.0 - 0.5 %    ABS. NEUTROPHILS 9.7 (H) 1.8 - 8.0 K/UL    ABS. LYMPHOCYTES 4.6 (H) 0.8 - 3.5 K/UL    ABS. MONOCYTES 1.6 (H) 0.0 - 1.0 K/UL    ABS. EOSINOPHILS 0.0 0.0 - 0.4 K/UL    ABS. BASOPHILS 0.0 0.0 - 0.1 K/UL    ABS. IMM.  GRANS. 0.1 (H) 0.00 - 0.04 K/UL    DF AUTOMATED     METABOLIC PANEL, COMPREHENSIVE    Collection Time: 09/30/22 11:07 AM   Result Value Ref Range    Sodium 133 (L) 136 - 145 mmol/L Potassium 4.7 3.5 - 5.1 mmol/L    Chloride 103 97 - 108 mmol/L    CO2 22 21 - 32 mmol/L    Anion gap 8 5 - 15 mmol/L    Glucose 174 (H) 65 - 100 mg/dL    BUN 16 6 - 20 MG/DL    Creatinine 1.20 0.70 - 1.30 MG/DL    BUN/Creatinine ratio 13 12 - 20      GFR est AA >60 >60 ml/min/1.73m2    GFR est non-AA >60 >60 ml/min/1.73m2    Calcium 8.8 8.5 - 10.1 MG/DL    Bilirubin, total 0.5 0.2 - 1.0 MG/DL    ALT (SGPT) 65 12 - 78 U/L    AST (SGOT) 35 15 - 37 U/L    Alk. phosphatase 90 45 - 117 U/L    Protein, total 7.1 6.4 - 8.2 g/dL    Albumin 3.5 3.5 - 5.0 g/dL    Globulin 3.6 2.0 - 4.0 g/dL    A-G Ratio 1.0 (L) 1.1 - 2.2     NT-PRO BNP    Collection Time: 09/30/22 11:07 AM   Result Value Ref Range    NT pro-BNP 34 <125 PG/ML   TROPONIN-HIGH SENSITIVITY    Collection Time: 09/30/22 11:07 AM   Result Value Ref Range    Troponin-High Sensitivity 6 0 - 76 ng/L     CT HEAD WO CONT    Result Date: 9/30/2022  No acute intracranial process seen    XR CHEST PORT    Result Date: 9/30/2022  Persistent right basilar scar versus subsegmental atelectasis. Current Medications:   No current facility-administered medications for this encounter. Current Outpatient Medications:     mometasone (ELOCON) 0.1 % ointment, Apply  to affected area daily. Apply to affected area on body daily for maximum of 2 weeks, Disp: 45 g, Rfl: 0    triamcinolone acetonide (KENALOG) 0.1 % topical cream, Apply  to affected area two (2) times a day. Apply to affected area on face twice daily for maximum of 2 weeks, Disp: 15 g, Rfl: 0    losartan (COZAAR) 50 mg tablet, Take 1 Tablet by mouth daily. , Disp: 90 Tablet, Rfl: 4    amLODIPine (NORVASC) 2.5 mg tablet, TAKE 1 TABLET EVERY DAY, Disp: 90 Tablet, Rfl: 3    busPIRone (BUSPAR) 10 mg tablet, Take 1 Tab by mouth three (3) times daily. , Disp: 180 Tab, Rfl: 3    FLUoxetine (PROZAC) 40 mg capsule, Take 1 Cap by mouth daily. , Disp: 90 Cap, Rfl: 3    mirtazapine (REMERON) 15 mg tablet, Take 1 Tab by mouth nightly., Disp: 90 Tab, Rfl: 3    sildenafil citrate (VIAGRA) 50 mg tablet, Take 1 Tab by mouth as needed (prn impotence). , Disp: 15 Tab, Rfl: 3    ARIPiprazole (ABILIFY) 5 mg tablet, Take  by mouth daily. , Disp: , Rfl:

## 2022-09-30 NOTE — ED TRIAGE NOTES
Pt was at work where he went to smoke a cigarette and when he came in he was lethargic. When EMS arrived pt was alert and talking, pt then fell, did not hit his head and lose consciousness. Pt did not have a gag reflex. EMS gave 4mg narcan and pt became alert approximately 5 minutes after.

## 2022-09-30 NOTE — ED NOTES
Pt sitting up in bed. Denies any complaints at this time. Denies n/v/d or dizziness or chest pain at this time. Pt states he smokes and asking for a patch or gum.   Will talk to MD.

## 2022-10-01 LAB
ANION GAP SERPL CALC-SCNC: 8 MMOL/L (ref 5–15)
APPEARANCE UR: CLEAR
BACTERIA URNS QL MICRO: NEGATIVE /HPF
BASOPHILS # BLD: 0 K/UL (ref 0–0.1)
BASOPHILS NFR BLD: 0 % (ref 0–1)
BILIRUB UR QL: NEGATIVE
BUN SERPL-MCNC: 13 MG/DL (ref 6–20)
BUN/CREAT SERPL: 17 (ref 12–20)
CALCIUM SERPL-MCNC: 9 MG/DL (ref 8.5–10.1)
CHLORIDE SERPL-SCNC: 106 MMOL/L (ref 97–108)
CO2 SERPL-SCNC: 23 MMOL/L (ref 21–32)
COLOR UR: NORMAL
CREAT SERPL-MCNC: 0.78 MG/DL (ref 0.7–1.3)
DIFFERENTIAL METHOD BLD: ABNORMAL
EOSINOPHIL # BLD: 0.1 K/UL (ref 0–0.4)
EOSINOPHIL NFR BLD: 0 % (ref 0–7)
EPITH CASTS URNS QL MICRO: NORMAL /LPF
ERYTHROCYTE [DISTWIDTH] IN BLOOD BY AUTOMATED COUNT: 13.7 % (ref 11.5–14.5)
GLUCOSE SERPL-MCNC: 126 MG/DL (ref 65–100)
GLUCOSE UR STRIP.AUTO-MCNC: NEGATIVE MG/DL
HCT VFR BLD AUTO: 42.6 % (ref 36.6–50.3)
HGB BLD-MCNC: 14 G/DL (ref 12.1–17)
HGB UR QL STRIP: NEGATIVE
IMM GRANULOCYTES # BLD AUTO: 0 K/UL (ref 0–0.04)
IMM GRANULOCYTES NFR BLD AUTO: 0 % (ref 0–0.5)
KETONES UR QL STRIP.AUTO: NEGATIVE MG/DL
LEUKOCYTE ESTERASE UR QL STRIP.AUTO: NEGATIVE
LYMPHOCYTES # BLD: 1.4 K/UL (ref 0.8–3.5)
LYMPHOCYTES NFR BLD: 9 % (ref 12–49)
MAGNESIUM SERPL-MCNC: 2.2 MG/DL (ref 1.6–2.4)
MCH RBC QN AUTO: 28.3 PG (ref 26–34)
MCHC RBC AUTO-ENTMCNC: 32.9 G/DL (ref 30–36.5)
MCV RBC AUTO: 86.2 FL (ref 80–99)
MONOCYTES # BLD: 1.1 K/UL (ref 0–1)
MONOCYTES NFR BLD: 7 % (ref 5–13)
NEUTS SEG # BLD: 12.8 K/UL (ref 1.8–8)
NEUTS SEG NFR BLD: 84 % (ref 32–75)
NITRITE UR QL STRIP.AUTO: NEGATIVE
NRBC # BLD: 0 K/UL (ref 0–0.01)
NRBC BLD-RTO: 0 PER 100 WBC
PH UR STRIP: 6.5 [PH] (ref 5–8)
PLATELET # BLD AUTO: 255 K/UL (ref 150–400)
PMV BLD AUTO: 10.1 FL (ref 8.9–12.9)
POTASSIUM SERPL-SCNC: 4 MMOL/L (ref 3.5–5.1)
PROCALCITONIN SERPL-MCNC: <0.05 NG/ML
PROT UR STRIP-MCNC: NEGATIVE MG/DL
RBC # BLD AUTO: 4.94 M/UL (ref 4.1–5.7)
RBC #/AREA URNS HPF: NORMAL /HPF (ref 0–5)
SODIUM SERPL-SCNC: 137 MMOL/L (ref 136–145)
SP GR UR REFRACTOMETRY: 1.01
TROPONIN-HIGH SENSITIVITY: 9 NG/L (ref 0–76)
UA: UC IF INDICATED,UAUC: NORMAL
UROBILINOGEN UR QL STRIP.AUTO: 0.2 EU/DL (ref 0.2–1)
WBC # BLD AUTO: 15.4 K/UL (ref 4.1–11.1)
WBC URNS QL MICRO: NORMAL /HPF (ref 0–4)

## 2022-10-01 PROCEDURE — 74011250637 HC RX REV CODE- 250/637: Performed by: GENERAL ACUTE CARE HOSPITAL

## 2022-10-01 PROCEDURE — 83735 ASSAY OF MAGNESIUM: CPT

## 2022-10-01 PROCEDURE — 80048 BASIC METABOLIC PNL TOTAL CA: CPT

## 2022-10-01 PROCEDURE — 74011250636 HC RX REV CODE- 250/636: Performed by: GENERAL ACUTE CARE HOSPITAL

## 2022-10-01 PROCEDURE — 74011000250 HC RX REV CODE- 250: Performed by: GENERAL ACUTE CARE HOSPITAL

## 2022-10-01 PROCEDURE — 65390000012 HC CONDITION CODE 44 OBSERVATION

## 2022-10-01 PROCEDURE — 81001 URINALYSIS AUTO W/SCOPE: CPT

## 2022-10-01 PROCEDURE — 96372 THER/PROPH/DIAG INJ SC/IM: CPT

## 2022-10-01 PROCEDURE — 84145 PROCALCITONIN (PCT): CPT

## 2022-10-01 PROCEDURE — 85025 COMPLETE CBC W/AUTO DIFF WBC: CPT

## 2022-10-01 PROCEDURE — 84484 ASSAY OF TROPONIN QUANT: CPT

## 2022-10-01 PROCEDURE — 65270000046 HC RM TELEMETRY

## 2022-10-01 PROCEDURE — 36415 COLL VENOUS BLD VENIPUNCTURE: CPT

## 2022-10-01 RX ADMIN — SODIUM CHLORIDE, PRESERVATIVE FREE 10 ML: 5 INJECTION INTRAVENOUS at 17:09

## 2022-10-01 RX ADMIN — MIRTAZAPINE 15 MG: 15 TABLET, FILM COATED ORAL at 21:32

## 2022-10-01 RX ADMIN — FLUOXETINE 40 MG: 20 CAPSULE ORAL at 08:41

## 2022-10-01 RX ADMIN — SODIUM CHLORIDE, PRESERVATIVE FREE 10 ML: 5 INJECTION INTRAVENOUS at 21:32

## 2022-10-01 RX ADMIN — BUSPIRONE HYDROCHLORIDE 10 MG: 10 TABLET ORAL at 08:41

## 2022-10-01 RX ADMIN — BUSPIRONE HYDROCHLORIDE 10 MG: 10 TABLET ORAL at 21:32

## 2022-10-01 RX ADMIN — ARIPIPRAZOLE 5 MG: 5 TABLET ORAL at 09:17

## 2022-10-01 RX ADMIN — BUSPIRONE HYDROCHLORIDE 10 MG: 10 TABLET ORAL at 17:08

## 2022-10-01 RX ADMIN — ENOXAPARIN SODIUM 40 MG: 100 INJECTION SUBCUTANEOUS at 08:41

## 2022-10-01 NOTE — PROGRESS NOTES
Hospitalist Progress Note    NAME: Bianka Laurent   :  1955   MRN:  [id number]       Assessment / Plan:  Syncope in setting of possible recent mental health med changes and positive UDS, and atypical cp  -cardiac enzymes unremarkable  -head CT negative for acute process  -CTA negative  -tele  -tsh, labs  -echo ordered and pending to be done  -pt states just prior to syncope he drank his friend's drink  -cardiology consult    Positive UDS for opiates  -pt denies any use of control pain meds or ilicit drug use     Leukocytosis without fever/cough, thought to be reactive/acute phase reactant  -monitor/trend     Bipolar  Schizophrenia  Resume home meds  Does not voice si/hi/hallucinations  Pt stated that 2 of his meds were recently changes, not sure which one       Barriers: pending echo, followup with cardiology (troponin negative)    Code status: Full  Prophylaxis: Lovenox  Recommended Disposition: Home w/Family     Subjective:     Chief Complaint / Reason for Physician Visit  Pt states he drank his friends soda just before passing out    Review of Systems:  Symptom Y/N Comments  Symptom Y/N Comments   Fever/Chills n   Chest Pain n    Poor Appetite    Edema n    Cough n   Abdominal Pain n    Sputum n   Joint Pain n    SOB/REDDING n   Pruritis/Rash     Nausea/vomit n   Tolerating PT/OT     Diarrhea n   Tolerating Diet y    Constipation n   Other       Could NOT obtain due to:      Objective:     VITALS:   Last 24hrs VS reviewed since prior progress note.  Most recent are:  Patient Vitals for the past 24 hrs:   Temp Pulse Resp BP SpO2   10/01/22 0729 -- -- -- 128/87 --   10/01/22 0728 -- -- -- (!) 145/86 --   10/01/22 0727 98.8 °F (37.1 °C) 99 15 (!) 146/80 95 %   10/01/22 0123 97.9 °F (36.6 °C) 99 16 112/75 96 %   10/01/22 0015 -- -- -- -- 95 %   10/01/22 0000 -- -- -- 109/80 94 %   22 2351 98.1 °F (36.7 °C) 95 16 126/85 97 %   22 2241 -- (!) 101 15 (!) 126/92 96 %   09/30/22 2230 -- -- -- (!) 143/102 -- 09/30/22 2200 -- (!) 111 -- (!) 132/97 94 %   09/30/22 2130 -- 93 -- (!) 142/93 93 %   09/30/22 2100 -- 90 15 120/71 --   09/30/22 1945 -- 96 15 129/88 95 %   09/30/22 1750 -- 84 10 118/83 --   09/30/22 1720 -- 82 -- (!) 124/95 93 %   09/30/22 1705 -- 84 13 137/87 100 %   09/30/22 1650 -- 98 16 103/88 99 %   09/30/22 1635 -- 80 -- 113/78 --   09/30/22 1620 -- 99 24 (!) 130/91 --   09/30/22 1605 -- 86 12 (!) 89/60 --   09/30/22 1550 -- 84 14 112/71 --   09/30/22 1535 -- 93 19 (!) 118/91 --   09/30/22 1520 -- 93 11 128/75 91 %   09/30/22 1505 -- 92 19 123/81 97 %   09/30/22 1450 -- 91 13 128/77 96 %   09/30/22 1435 -- 74 -- 115/80 95 %   09/30/22 1420 -- 77 12 119/80 94 %   09/30/22 1405 -- 79 9 119/81 --   09/30/22 1350 -- 82 9 -- --     No intake or output data in the 24 hours ending 10/01/22 1348     I had a face to face encounter and independently examined this patient on 10/1/2022, as outlined below:  PHYSICAL EXAM:  General: Alert, cooperative, no acute distress    EENT:  Anicteric sclerae. MMM  Resp:  CTA bilaterally, no wheezing or rales. No accessory muscle use  CV:  Regular  rhythm,  No edema  GI:  Soft, Non distended, Non tender. +Bowel sounds  Neurologic:  Alert and oriented X 3, normal speech,   Psych:   Not anxious nor agitated  Skin:  No cyanosis. No jaundice    Reviewed most current lab test results and cultures  YES  Reviewed most current radiology test results   YES  Review and summation of old records today    NO  Reviewed patient's current orders and MAR    YES  PMH/SH reviewed - no change compared to H&P  ________________________________________________________________________  Care Plan discussed with:    Comments   Patient x    Family      RN     Care Manager     Consultant                        Multidiciplinary team rounds were held today with , nursing, pharmacist and clinical coordinator.   Patient's plan of care was discussed; medications were reviewed and discharge planning was addressed. ________________________________________________________________________  Total NON critical care TIME:  30   Minutes    Total CRITICAL CARE TIME Spent:   Minutes non procedure based      Comments   >50% of visit spent in counseling and coordination of care     ________________________________________________________________________  Darlin Loya MD     Procedures: see electronic medical records for all procedures/Xrays and details which were not copied into this note but were reviewed prior to creation of Plan. LABS:  I reviewed today's most current labs and imaging studies.   Pertinent labs include:  Recent Labs     10/01/22  0141 09/30/22  1107   WBC 15.4* 15.9*   HGB 14.0 14.5   HCT 42.6 43.9    287     Recent Labs     10/01/22  0141 09/30/22  1107    133*   K 4.0 4.7    103   CO2 23 22   * 174*   BUN 13 16   CREA 0.78 1.20   CA 9.0 8.8   MG 2.2  --    ALB  --  3.5   TBILI  --  0.5   ALT  --  65       Signed: Darlin Loya MD

## 2022-10-01 NOTE — PROGRESS NOTES
TRANSFER - IN REPORT:    Verbal report received from Tony RN(name) on Marjan France  being received from ER(unit) for routine progression of care      Report consisted of patients Situation, Background, Assessment and   Recommendations(SBAR). Information from the following report(s) SBAR was reviewed with the receiving nurse. Opportunity for questions and clarification was provided. Assessment completed upon patients arrival to unit and care assumed.

## 2022-10-01 NOTE — ED NOTES
TRANSFER - OUT REPORT:    Verbal report given to Janice Garcia RN(name) on Jose Elias Condon  being transferred to [facility](unit) for routine progression of care       Report consisted of patients Situation, Background, Assessment and   Recommendations(SBAR). Information from the following report(s) SBAR, Kardex, ED Summary, Intake/Output and MAR was reviewed with the receiving nurse. Lines:   Peripheral IV 09/30/22 Left Antecubital (Active)       Peripheral IV 09/30/22 Right Antecubital (Active)        Opportunity for questions and clarification was provided.       Patient transported with:   Monitor

## 2022-10-01 NOTE — CONSULTS
[facility]  [address], [address] [place] [address]  [phone]  WWW. [address]       Date of  Admission: 9/30/2022 11:08 AM     Admission type:Emergency   Primary Care Physician:Andrés Tao MD     Attending Provider: Alexander Pinedo MD  Cardiology Provider:     PLEASE NOTE THAT WE CONFIRMED WITH THE REFERRING PHYSICIAN PRIOR TO SEEING THE PATIENT THAT THE PATIENT IS BEING REFERRED FOR INITIAL HOSPITAL EVALUATION AND FOR LONG-TERM ONGOING CARDIAC CARE    CC/REASON FOR CONSULT: syncope      Subjective:     Alvarez Perry is a 75 y.o. male admitted for Syncope and collapse [R55]. patient is a 50-year-old male admitted for 4 syncopal episode. 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 When EMS arrived according to chart he did not have a gag reflex, he required mechanical ventilation and responded to Narcan after 5 to 10 minutes. Patient presents sitting upright in bed this morning no acute distress ANO x3. Denies chest pain or shortness of breath. We discussed his episode of chest pain that was reported in the ER he states he had some intermittent feelings in his chest that were described as a quick ache or tightness lasting a few seconds and resolved. Not associated with physical activity, dietary changes or stress level. He denies family history of atherosclerotic heart disease. Cardiac risk factors as male, positive smoker. ,  Hypertension.       Patient Active Problem List    Diagnosis Date Noted    Syncope and collapse 09/30/2022    Schizoaffective disorder, depressive type ([facility] 10/20/2013    Cervical spondylosis with myelopathy 05/10/2012      David Pa MD  Past Medical History:   Diagnosis Date    Bipolar 1 disorder ([address]     Cervical spondylosis with myelopathy 5/10/2012    Chronic pain     Depression     DJD (degenerative joint disease)     Herpes     HX OTHER MEDICAL     Mood disorder (HCC)     Numbness and tingling in right hand     Psychiatric disorder     ETOH ; SCHIZOAFFECTIVE DISORDER    Psychiatric disorder     POSSIBLE DEMENTIA    Schizo affective schizophrenia ([facility]     Schizoaffective disorder, depressive type ([facility] 10/20/2013    Schizophrenia ([facility]     Sleep disorder       Social History     Socioeconomic History    Marital status:    Tobacco Use    Smoking status: Every Day     Packs/day: 1.00     Years: 40.00     Pack years: 40.00     Types: Cigarettes    Smokeless tobacco: Never   Substance and Sexual Activity    Alcohol use: No    Drug use: Yes     Types: Marijuana     Comment: last use \"a couple of months ago\"    Sexual activity: Yes     Partners: Female     Birth control/protection: None     No Known Allergies   Family History   Problem Relation Age of Onset    Cancer Father     Hypertension Paternal Aunt     Hypertension Paternal Uncle       Current Facility-Administered Medications   Medication Dose Route Frequency    ARIPiprazole (ABILIFY) tablet 5 mg  5 mg Oral DAILY    busPIRone (BUSPAR) tablet 10 mg  10 mg Oral TID    FLUoxetine (PROzac) capsule 40 mg  40 mg Oral DAILY    mirtazapine (REMERON) tablet 15 mg  15 mg Oral QHS    sodium chloride (NS) flush 5-40 mL  5-40 mL IntraVENous Q8H    sodium chloride (NS) flush 5-40 mL  5-40 mL IntraVENous PRN    acetaminophen (TYLENOL) tablet 650 mg  650 mg Oral Q6H PRN    Or    acetaminophen (TYLENOL) suppository 650 mg  650 mg Rectal Q6H PRN    polyethylene glycol (MIRALAX) packet 17 g  17 g Oral DAILY PRN    ondansetron (ZOFRAN ODT) tablet 4 mg  4 mg Oral Q8H PRN    Or    ondansetron (ZOFRAN) injection 4 mg  4 mg IntraVENous Q6H PRN    enoxaparin (LOVENOX) injection 40 mg  40 mg SubCUTAneous DAILY    nicotine (NICODERM CQ) 14 mg/24 hr patch 1 Patch  1 Patch TransDERmal DAILY        Review of Symptoms:     Constitutional: Negative for chills, fever and weight loss or excessive fatigue  HENT: Negative for nosebleeds, difficulty swallowing or tissue swelling   Eyes: Negative for blurred vision, double vision, occular pain  Respiratory: Negative for cough, shortness of breath , wheezing. Gastrointestinal: Negative for abdominal pain, nausea/vomiting, blood in stool. Cardiovascular: Negative for chest pain, palpitations, orthopnea, leg swelling and PND. Skin: Negative for rash, persistent diaphoresis, persistent pruritis  Neurological: Negative for dizziness, loss of consciousness, slurred speech, headache. Psychiatric: Negative for significant anxiety, memory disturbance, change in mood. Objective:      Visit Vitals  /87 (BP 1 Location: Right upper arm, BP Patient Position: Standing)   Pulse 99   Temp 98.8 °F (37.1 °C)   Resp 15   Ht 5' 8\" (1.727 m)   Wt 82.6 kg (182 lb)   SpO2 95%   BMI 27.67 kg/m²       Physical Exam     General: Well developed, in no acute distress, cooperative and alert  HEENT: No carotid bruits, no JVD, trach is midline. Neck Supple,   Heart:  Normal S1/S2 negative S3 or S4. Regular, no murmur, gallop or rub. Respiratory: Clear bilaterally x 4, no wheezing or rales  Abdomen:   Soft, non-tender, no masses, bowel sounds are active. Extremities:  No edema, normal cap refill, no cyanosis, atraumatic. Neuro: A&Ox3, speech clear, gait stable. Skin: Skin color is normal. No rashes or lesions.  Non diaphoretic  Vascular: 2+ pulses symmetric in all extremities    Data Review:   Recent Labs     10/01/22  0141 09/30/22  1107   WBC 15.4* 15.9*   HGB 14.0 14.5   HCT 42.6 43.9    287     Recent Labs     10/01/22  0141 09/30/22  1107    133*   K 4.0 4.7    103   CO2 23 22   * 174*   BUN 13 16   CREA 0.78 1.20   CA 9.0 8.8   MG 2.2  --    ALB  --  3.5   TBILI  --  0.5   ALT  --  65       No results for input(s): TROIQ, CPK, CKMB in the last 72 hours. No intake or output data in the 24 hours ending 10/01/22 1040     Cardiographics    Telemetry: NSR   ECG: Sinus Tach RBBB  Echocardiogram: pending   CXRAY:Persistent right basilar scar versus subsegmental atelectasis       Assessment:       Active Problems:    Syncope and collapse (9/30/2022)         Plan:     1. Syncopal episode: Unknown specific trigger, UDS positive for opiates patient unaware of how that would have happened. Responded to Narcan. Proceed with 2D echocardiogram, no arrhythmia on telemetry. Discussed outpatient placement of internal loop recorder for long-term surveillance of arrhythmia. 2.  Atypical chest pain: Has occurred in the last 1 to 2 weeks, not precipitated by activity. Troponin negative, nuclear stress test ordered for Monday. 3.  Hypertension: Controlled this a.m., outpatient record shows losartan and amlodipine. Avoid ayaz agents. ECHO this weekend, NST on Monday, continue Tele, consider outpatient loop recorder placement.      SANDI Trevizo MD

## 2022-10-01 NOTE — PROGRESS NOTES
0700: Bedside shift change report given to Juana Mendoza (oncoming nurse) by Darling Kim (offgoing nurse). Report included the following information SBAR and Kardex. 0730: Orthostatic BP taken; normal BP. See flowsheet; separate note. End of Shift Note    Bedside shift change report given to Darling Kim (oncoming nurse) by Bossman Lewis (offgoing nurse). Report included the following information SBAR and Kardex    Shift worked:  day     Shift summary and any significant changes:     Not orthostatic on shift    Seen by Cards;  Plan for [employer] on [facility] [employer]     Concerns for physician to address:       Zone phone for oncoming shift:          Activity:  Activity Level: Up with Assistance  Number times ambulated in hallways past shift: 0  Number of times OOB to chair past shift: 1    Cardiac:   Cardiac Monitoring: Yes      Cardiac Rhythm: Sinus Rhythm    Access:  Current line(s): PIV     Genitourinary:   Urinary status: voiding    Respiratory:   O2 Device: None (Room air)  Chronic home O2 use?: NO  Incentive spirometer at bedside: YES       GI:  Last Bowel Movement Date: 09/29/22  Current diet:  ADULT DIET Regular  DIET NPO  Passing flatus: YES  Tolerating current diet: YES       Pain Management:   Patient states pain is manageable on current regimen: YES    Skin:  Dani Score: 23  Interventions: increase time out of bed    Patient Safety:  Fall Score:  Total Score: 2  Interventions: gripper socks       Length of Stay:  Expected LOS: - - -  Actual LOS: 1      Bossman Lewis

## 2022-10-01 NOTE — PROGRESS NOTES
Problem: Falls - Risk of  Goal: *Absence of Falls  Description: Document Stacie Skill Fall Risk and appropriate interventions in the flowsheet.   Outcome: Progressing Towards Goal  Note: Fall Risk Interventions:

## 2022-10-01 NOTE — PROGRESS NOTES
10/01/22 0727 10/01/22 0728 10/01/22 0729   Vitals   BP (!) 146/80 (!) 145/86 128/87   MAP (Calculated) 102 106 101   BP 1 Location Right upper arm Right upper arm Right upper arm   BP 1 Method Automatic Automatic Automatic   BP Patient Position Lying Sitting Standing     Orthostatic BPs

## 2022-10-01 NOTE — PROGRESS NOTES
UDS positive for opiates, has sildenifil listed as home med  Troponin negative  Echo pending--->if echo is unrevealing, consider dispo for possible discharge in 1-2 days

## 2022-10-02 LAB
ALBUMIN SERPL-MCNC: 2.9 G/DL (ref 3.5–5)
ANION GAP SERPL CALC-SCNC: 4 MMOL/L (ref 5–15)
BASOPHILS # BLD: 0 K/UL (ref 0–0.1)
BASOPHILS NFR BLD: 0 % (ref 0–1)
BUN SERPL-MCNC: 7 MG/DL (ref 6–20)
BUN/CREAT SERPL: 11 (ref 12–20)
CALCIUM SERPL-MCNC: 8.8 MG/DL (ref 8.5–10.1)
CHLORIDE SERPL-SCNC: 108 MMOL/L (ref 97–108)
CO2 SERPL-SCNC: 27 MMOL/L (ref 21–32)
CREAT SERPL-MCNC: 0.65 MG/DL (ref 0.7–1.3)
DIFFERENTIAL METHOD BLD: NORMAL
EOSINOPHIL # BLD: 0.2 K/UL (ref 0–0.4)
EOSINOPHIL NFR BLD: 2 % (ref 0–7)
ERYTHROCYTE [DISTWIDTH] IN BLOOD BY AUTOMATED COUNT: 13.2 % (ref 11.5–14.5)
GLUCOSE SERPL-MCNC: 97 MG/DL (ref 65–100)
HCT VFR BLD AUTO: 40.6 % (ref 36.6–50.3)
HGB BLD-MCNC: 13.7 G/DL (ref 12.1–17)
IMM GRANULOCYTES # BLD AUTO: 0 K/UL (ref 0–0.04)
IMM GRANULOCYTES NFR BLD AUTO: 0 % (ref 0–0.5)
LYMPHOCYTES # BLD: 2.6 K/UL (ref 0.8–3.5)
LYMPHOCYTES NFR BLD: 28 % (ref 12–49)
MCH RBC QN AUTO: 29 PG (ref 26–34)
MCHC RBC AUTO-ENTMCNC: 33.7 G/DL (ref 30–36.5)
MCV RBC AUTO: 85.8 FL (ref 80–99)
MONOCYTES # BLD: 0.8 K/UL (ref 0–1)
MONOCYTES NFR BLD: 9 % (ref 5–13)
NEUTS SEG # BLD: 5.5 K/UL (ref 1.8–8)
NEUTS SEG NFR BLD: 61 % (ref 32–75)
NRBC # BLD: 0 K/UL (ref 0–0.01)
NRBC BLD-RTO: 0 PER 100 WBC
PHOSPHATE SERPL-MCNC: 3.7 MG/DL (ref 2.6–4.7)
PLATELET # BLD AUTO: 225 K/UL (ref 150–400)
PMV BLD AUTO: 11.2 FL (ref 8.9–12.9)
POTASSIUM SERPL-SCNC: 4.9 MMOL/L (ref 3.5–5.1)
RBC # BLD AUTO: 4.73 M/UL (ref 4.1–5.7)
SODIUM SERPL-SCNC: 139 MMOL/L (ref 136–145)
TSH SERPL DL<=0.05 MIU/L-ACNC: 4.35 UIU/ML (ref 0.36–3.74)
VIT B12 SERPL-MCNC: 251 PG/ML (ref 193–986)
WBC # BLD AUTO: 9.2 K/UL (ref 4.1–11.1)

## 2022-10-02 PROCEDURE — 80069 RENAL FUNCTION PANEL: CPT

## 2022-10-02 PROCEDURE — 74011250637 HC RX REV CODE- 250/637: Performed by: GENERAL ACUTE CARE HOSPITAL

## 2022-10-02 PROCEDURE — 74011000250 HC RX REV CODE- 250: Performed by: GENERAL ACUTE CARE HOSPITAL

## 2022-10-02 PROCEDURE — 74011250636 HC RX REV CODE- 250/636: Performed by: GENERAL ACUTE CARE HOSPITAL

## 2022-10-02 PROCEDURE — 36415 COLL VENOUS BLD VENIPUNCTURE: CPT

## 2022-10-02 PROCEDURE — 82607 VITAMIN B-12: CPT

## 2022-10-02 PROCEDURE — 84443 ASSAY THYROID STIM HORMONE: CPT

## 2022-10-02 PROCEDURE — 96372 THER/PROPH/DIAG INJ SC/IM: CPT

## 2022-10-02 PROCEDURE — 65390000012 HC CONDITION CODE 44 OBSERVATION

## 2022-10-02 PROCEDURE — G0378 HOSPITAL OBSERVATION PER HR: HCPCS

## 2022-10-02 PROCEDURE — 74011250637 HC RX REV CODE- 250/637: Performed by: NURSE PRACTITIONER

## 2022-10-02 PROCEDURE — 85025 COMPLETE CBC W/AUTO DIFF WBC: CPT

## 2022-10-02 RX ORDER — AMLODIPINE BESYLATE 2.5 MG/1
2.5 TABLET ORAL DAILY
Status: DISCONTINUED | OUTPATIENT
Start: 2022-10-02 | End: 2022-10-03 | Stop reason: HOSPADM

## 2022-10-02 RX ORDER — HYDRALAZINE HYDROCHLORIDE 20 MG/ML
10 INJECTION INTRAMUSCULAR; INTRAVENOUS
Status: DISCONTINUED | OUTPATIENT
Start: 2022-10-02 | End: 2022-10-03 | Stop reason: HOSPADM

## 2022-10-02 RX ADMIN — BUSPIRONE HYDROCHLORIDE 10 MG: 10 TABLET ORAL at 21:32

## 2022-10-02 RX ADMIN — BUSPIRONE HYDROCHLORIDE 10 MG: 10 TABLET ORAL at 16:10

## 2022-10-02 RX ADMIN — SODIUM CHLORIDE, PRESERVATIVE FREE 10 ML: 5 INJECTION INTRAVENOUS at 03:30

## 2022-10-02 RX ADMIN — SODIUM CHLORIDE, PRESERVATIVE FREE 10 ML: 5 INJECTION INTRAVENOUS at 14:42

## 2022-10-02 RX ADMIN — BUSPIRONE HYDROCHLORIDE 10 MG: 10 TABLET ORAL at 09:28

## 2022-10-02 RX ADMIN — ENOXAPARIN SODIUM 40 MG: 100 INJECTION SUBCUTANEOUS at 09:28

## 2022-10-02 RX ADMIN — MIRTAZAPINE 15 MG: 15 TABLET, FILM COATED ORAL at 21:32

## 2022-10-02 RX ADMIN — SODIUM CHLORIDE, PRESERVATIVE FREE 10 ML: 5 INJECTION INTRAVENOUS at 21:33

## 2022-10-02 RX ADMIN — FLUOXETINE 40 MG: 20 CAPSULE ORAL at 09:28

## 2022-10-02 RX ADMIN — AMLODIPINE BESYLATE 2.5 MG: 2.5 TABLET ORAL at 09:28

## 2022-10-02 RX ADMIN — ARIPIPRAZOLE 5 MG: 5 TABLET ORAL at 09:28

## 2022-10-02 NOTE — PROGRESS NOTES
0700: Bedside shift change report given to Hiral Caputo RN/Tamanna RN (oncoming nurse) by Tre Alvarez RN (offgoing nurse). Report included the following information SBAR and Kardex.

## 2022-10-02 NOTE — PROGRESS NOTES
Problem: Falls - Risk of  Goal: *Absence of Falls  Description: Document Rekha Gricelda Fall Risk and appropriate interventions in the flowsheet.   Outcome: Progressing Towards Goal  Note: Fall Risk Interventions:            Medication Interventions: Teach patient to arise slowly, Patient to call before getting OOB         History of Falls Interventions: Consult care management for discharge planning, Bed/chair exit alarm, Door open when patient unattended, Evaluate medications/consider consulting pharmacy, Investigate reason for fall, Room close to nurse's station

## 2022-10-02 NOTE — PROGRESS NOTES
0700 Bedside shift change report given to [address] [place] [address] (oncoming nurse) by Amanda Frazier (offgoing nurse). Report included the following information SBAR and Kardex. End of Shift Note    Bedside shift change report given to Yarelis Miller (oncoming nurse) by Jonnathan Paula RN (offgoing nurse). Report included the following information SBAR and Kardex    Shift worked:  Day     Shift summary and any significant changes:     Uneventful shift. NPO at midnight for stress test tomorrow. Concerns for physician to address:       Zone phone for oncoming shift:          Activity:  Activity Level: Up ad sawyer  Number times ambulated in hallways past shift: 0  Number of times OOB to chair past shift: 4    Cardiac:   Cardiac Monitoring: Yes      Cardiac Rhythm: Sinus Rhythm    Access:  Current line(s): PIV     Genitourinary:   Urinary status: voiding    Respiratory:   O2 Device: None (Room air)  Chronic home O2 use?: NO  Incentive spirometer at bedside: NO       GI:  Last Bowel Movement Date: 09/29/22  Current diet:  ADULT DIET Regular  DIET NPO  Passing flatus: YES  Tolerating current diet: YES       Pain Management:   Patient states pain is manageable on current regimen: YES    Skin:  Dani Score: 23  Interventions: speciality bed and increase time out of bed    Patient Safety:  Fall Score:  Total Score: 2  Interventions: gripper socks       Length of Stay:  Expected LOS: - - -  Actual LOS: 2      Jonnathan Paula RN

## 2022-10-02 NOTE — PROGRESS NOTES
Based on pharmacy records in [address] [place] [address] and conversation with patient, I believe he is taking the following psych meds currently:    Fluoxetine 20 mg daily  Haloperidol 10 mg qAM and 20 mg qPM  Mirtazapine 15 mg qhs  Quetiapine 400 mg qhs  Trazodone 100 mg qhs  Hydroxyzine 50 mg bid prn    He states that he does not take aripiprazole

## 2022-10-02 NOTE — PROGRESS NOTES
Occupational Therapy:    Consult received, chart reviewed and spoke with RN. Pt is independent, has no current deficits with mobility or ADL completion and orthostatic BP readings have been obtained by nursing staff. OT will sign off.      [facility], OTR/L

## 2022-10-02 NOTE — PROGRESS NOTES
Transition of Care Plan:    RUR: OBS. Downgraded from Inpatient to OBS. Virtual reviewer communicated change to CM, which reflect outpatient observation order written prior to Written discharge order. Code 44 delivered and given to patient. CM met with the patient and provided to the patient the printed information about her outpatient observation status. The patient was given the flyer entitled, \"Medicare Outpatient Observation: Information & notification. \" All questions were answered, no additional discharge needs identified at this time and patient expects to return to their (home, assisted living facility, relatives home, etc.) after discharge today. Copy provided to patient or sent secure email, secure fax , or certified mail to patient's loved one per their request.   Disposition: Plan Home  2;45 PM   Plan to have Stress Test tomorrow and if all is well Home in the PM.   Follow up appointments: PCP: Dinh: BETTY emailed CM Specialist to make appt   DME needed: n/a   Transportation at Discharge: Sister or Son might be able to transport. [place] Open to the Idea of roundtrip lyft ride if needed. [place] or means to access home:    yes    IM Medicare Letter: n/a OBS  Is patient a [place] and connected with the [place]? no               If yes, was [employer] transfer form completed and VA notified? Caregiver Contact: sister in law: Lawrence Copper: [phone]  Discharge Caregiver contacted prior to discharge? Pt is alert and oriented and preferred to update family on his own.    Care Conference needed?:               no    Reason for Admission:  Pt was admitted on 9/30/22 d/t syncope and episode of chest pain                     RUR Score:          OBS           Plan for utilizing home health:      n/a    PCP: First and Last name:  Colt Rodriguez MD     Name of Practice:    Are you a current patient: Yes/No: yes   Approximate date of last visit: 2 weeks   Can you participate in a virtual visit with your PCP: yes Current Advanced Directive/Advance Care Plan: Full Code      Healthcare Decision Maker: sister in law: Gilmer Stacy: [phone]    Eval  Pt is alert and oriented. Lives alone in second floor apt with elevator to enter. No DME  No HH or SNF experience. Has been to ETOH rehab years ago in [place]. Reports he is now sober. I W ADLs. Does not drive. Insurance and family transport him as needed. Pharmacy: [facility] order or [facility] [address]. CM will continue to monitor discharge plan. Care Management Interventions  PCP Verified by CM: Yes  Palliative Care Criteria Met (RRAT>21 & CHF Dx)?: No  Mode of Transport at Discharge:  Other (see comment)  Transition of Care Consult (CM Consult): Discharge Planning  MyChart Signup: No  Discharge Durable Medical Equipment: No  Physical Therapy Consult: No  Occupational Therapy Consult: No  Support Systems: Other Family Member(s), Friend/Neighbor, [employer] [employer]  Confirm Follow Up Transport: Family  Discharge Location  Patient Expects to be Discharged to[de-identified] Critical access hospital [address], Weekend CM  Ext [phone]

## 2022-10-02 NOTE — PROGRESS NOTES
Problem: Syncope  Goal: *Absence of injury  Outcome: Progressing Towards Goal  Goal: Decrease or eliminate episodes of syncope  Outcome: Progressing Towards Goal     Problem: Patient Education: Go to Patient Education Activity  Goal: Patient/Family Education  Outcome: Progressing Towards Goal     Problem: Falls - Risk of  Goal: *Absence of Falls  Description: Document Juli Fuller Fall Risk and appropriate interventions in the flowsheet.   Outcome: Progressing Towards Goal  Note: Fall Risk Interventions:            Medication Interventions: Patient to call before getting OOB         History of Falls Interventions: Door open when patient unattended, Evaluate medications/consider consulting pharmacy         Problem: Patient Education: Go to Patient Education Activity  Goal: Patient/Family Education  Outcome: Progressing Towards Goal

## 2022-10-02 NOTE — PROGRESS NOTES
[facility]  [address]  [phone]  WWW. [email or web address]  CARDIOLOGY PROGRESS NOTE    10/2/2022 8:48 AM    Admit Date: 9/30/2022    Admit Diagnosis:   Syncope and collapse [R55]    Subjective:     Alvarez Aland no over night isses, bp elevated this Am, previously on ARB amlodipine out patient     Visit Vitals  BP (!) 156/92 (BP 1 Location: Left upper arm, BP Patient Position: Sitting)   Pulse 91   Temp 98.5 °F (36.9 °C)   Resp 16   Ht 5' 8\" (1.727 m)   Wt 82.6 kg (182 lb)   SpO2 90%   BMI 27.67 kg/m²       Current Facility-Administered Medications   Medication Dose Route Frequency    hydrALAZINE (APRESOLINE) 20 mg/mL injection 10 mg  10 mg IntraVENous Q6H PRN    lactulose (CHRONULAC) 10 gram/15 mL solution 15 mL  10 g Oral BID PRN    ARIPiprazole (ABILIFY) tablet 5 mg  5 mg Oral DAILY    busPIRone (BUSPAR) tablet 10 mg  10 mg Oral TID    FLUoxetine (PROzac) capsule 40 mg  40 mg Oral DAILY    mirtazapine (REMERON) tablet 15 mg  15 mg Oral QHS    sodium chloride (NS) flush 5-40 mL  5-40 mL IntraVENous Q8H    sodium chloride (NS) flush 5-40 mL  5-40 mL IntraVENous PRN    acetaminophen (TYLENOL) tablet 650 mg  650 mg Oral Q6H PRN    Or    acetaminophen (TYLENOL) suppository 650 mg  650 mg Rectal Q6H PRN    polyethylene glycol (MIRALAX) packet 17 g  17 g Oral DAILY PRN    ondansetron (ZOFRAN ODT) tablet 4 mg  4 mg Oral Q8H PRN    Or    ondansetron (ZOFRAN) injection 4 mg  4 mg IntraVENous Q6H PRN    enoxaparin (LOVENOX) injection 40 mg  40 mg SubCUTAneous DAILY    nicotine (NICODERM CQ) 14 mg/24 hr patch 1 Patch  1 Patch TransDERmal DAILY         Objective:      Physical Exam    General: Well developed, in no acute distress, cooperative and alert  HEENT: No carotid bruits, no JVD, trach is midline. t. Heart:  Normal S1/S2 negative S3 or S4. Regular, no murmur, gallop or rub.    Respiratory: Clear bilaterally x 4, no wheezing or rales  Abdomen: Soft, , bowel sounds are active. Extremities:  No edema, normal cap refill, no cyanosis, atraumatic. Neuro: A&Ox3, speech clear, . Skin: Skin color is normal. . Non diaphoretic  Vascular: 2+ pulses symmetric in all extremities      Data Review:   Recent Labs     10/02/22  0316 10/01/22  0141 09/30/22  1107   WBC 9.2 15.4* 15.9*   HGB 13.7 14.0 14.5   HCT 40.6 42.6 43.9    255 287     Recent Labs     10/02/22  0316 10/01/22  0141 09/30/22  1107    137 133*   K 4.9 4.0 4.7    106 103   CO2 27 23 22   GLU 97 126* 174*   BUN 7 13 16   CREA 0.65* 0.78 1.20   CA 8.8 9.0 8.8   MG  --  2.2  --    PHOS 3.7  --   --    ALB 2.9*  --  3.5   TBILI  --   --  0.5   ALT  --   --  65       No results for input(s): TROIQ, CPK, CKMB in the last 72 hours. No intake or output data in the 24 hours ending 10/02/22 0848     Telemetry: NSR  EKG:  Cxray:    Assessment:     Active Problems:    Syncope and collapse (9/30/2022)        Plan:     1. Syncopal episode: Unknown specific trigger, UDS positive for opiates patient unaware of how that would have happened. Responded to Narcan. Proceed with 2D echocardiogram, no arrhythmia on telemetry. Discussed outpatient placement of internal loop recorder for long-term surveillance of arrhythmia. 2.  Atypical chest pain: Has occurred in the last 1 to 2 weeks, not precipitated by activity. Troponin negative, nuclear stress test ordered for Monday. 3.  Hypertension: Elevated this Am will start amlodipine 2.5mg      ECHO remains pending  NST on Monday, continue Tele,  outpatient loop recorder placement. Amlodipine added this AM for BP control. Avoid ayaz agents.      Alray Skiff, SANDI

## 2022-10-02 NOTE — PROGRESS NOTES
Physical Therapy:  Chart reviewed. Discussed with nurse. Pt is up ad sawyer at this time, no device, no LOB, screen and D/C PT order at this time.

## 2022-10-02 NOTE — PROGRESS NOTES
Hospitalist Progress Note    NAME: Ruthy Upton   :  1955   MRN:  [id number]       Assessment / Plan:  Syncope in setting of possible recent mental health med changes and positive UDS, and atypical cp  -cardiac enzymes unremarkable  -head CT negative for acute process  -CTA negative  -tele  -tsh, labs  -echo ordered and pending to be done  -pt states just prior to syncope he drank his friend's drink  -cardiology consult    Positive UDS for opiates  -pt denies any use of control pain meds or ilicit drug use     Leukocytosis without fever/cough, thought to be reactive/acute phase reactant  -monitor/trend     Bipolar  Schizophrenia  Resume home meds  Does not voice si/hi/hallucinations  Pt stated that 2 of his meds were recently changes, not sure which one       Barriers: pending stress test Monday    Code status: Full  Prophylaxis: Lovenox  Recommended Disposition: Home w/Family     Subjective:     Chief Complaint / Reason for Physician Visit  Awake, no complaints, aware of the planned cardiac procedure    Review of Systems:  Symptom Y/N Comments  Symptom Y/N Comments   Fever/Chills n   Chest Pain n    Poor Appetite    Edema n    Cough n   Abdominal Pain n    Sputum n   Joint Pain n    SOB/REDDING n   Pruritis/Rash     Nausea/vomit n   Tolerating PT/OT     Diarrhea n   Tolerating Diet y    Constipation n   Other       Could NOT obtain due to:      Objective:     VITALS:   Last 24hrs VS reviewed since prior progress note.  Most recent are:  Patient Vitals for the past 24 hrs:   Temp Pulse Resp BP SpO2   10/02/22 1116 98.1 °F (36.7 °C) 95 18 (!) 144/93 98 %   10/02/22 0741 98.5 °F (36.9 °C) 91 16 (!) 156/92 90 %   10/02/22 0329 98.2 °F (36.8 °C) 83 18 137/88 95 %   10/01/22 2336 97.9 °F (36.6 °C) 86 20 (!) 147/82 97 %   10/01/22 1924 98 °F (36.7 °C) 85 18 (!) 148/94 95 %   10/01/22 1529 98.3 °F (36.8 °C) 93 16 (!) 144/86 94 %   10/01/22 1229 98.4 °F (36.9 °C) (!) 101 18 128/88 93 %       No intake or output data in the 24 hours ending 10/02/22 1129     I had a face to face encounter and independently examined this patient on 10/2/2022, as outlined below:  PHYSICAL EXAM:  General: Alert, cooperative, no acute distress    EENT:  Anicteric sclerae. MMM  Resp:  CTA bilaterally, no wheezing or rales. No accessory muscle use  CV:  Regular  rhythm,  No edema  GI:  Soft, Non distended, Non tender. +Bowel sounds  Neurologic:  Alert and oriented X 3, normal speech,   Psych:   Not anxious nor agitated  Skin:  No cyanosis. No jaundice    Reviewed most current lab test results and cultures  YES  Reviewed most current radiology test results   YES  Review and summation of old records today    NO  Reviewed patient's current orders and MAR    YES  PMH/ reviewed - no change compared to H&P  ________________________________________________________________________  Care Plan discussed with:    Comments   Patient x    Family      RN     Care Manager     Consultant                        Multidiciplinary team rounds were held today with , nursing, pharmacist and clinical coordinator. Patient's plan of care was discussed; medications were reviewed and discharge planning was addressed. ________________________________________________________________________  Total NON critical care TIME:  30   Minutes    Total CRITICAL CARE TIME Spent:   Minutes non procedure based      Comments   >50% of visit spent in counseling and coordination of care     ________________________________________________________________________  Jenifer Jones MD     Procedures: see electronic medical records for all procedures/Xrays and details which were not copied into this note but were reviewed prior to creation of Plan. LABS:  I reviewed today's most current labs and imaging studies.   Pertinent labs include:  Recent Labs     10/02/22  0316 10/01/22  0141 09/30/22  1107   WBC 9.2 15.4* 15.9*   HGB 13.7 14.0 14.5   HCT 40.6 42.6 43.9    255 287 Recent Labs     10/02/22  0316 10/01/22  0141 09/30/22  1107    137 133*   K 4.9 4.0 4.7    106 103   CO2 27 23 22   GLU 97 126* 174*   BUN 7 13 16   CREA 0.65* 0.78 1.20   CA 8.8 9.0 8.8   MG  --  2.2  --    PHOS 3.7  --   --    ALB 2.9*  --  3.5   TBILI  --   --  0.5   ALT  --   --  65         Signed: Willie Chow MD

## 2022-10-03 ENCOUNTER — HOSPITAL ENCOUNTER (OUTPATIENT)
Dept: NUCLEAR MEDICINE | Age: 67
Setting detail: OBSERVATION
Discharge: HOME | End: 2022-10-03
Attending: NURSE PRACTITIONER
Payer: MEDICARE

## 2022-10-03 ENCOUNTER — HOSPITAL ENCOUNTER (OUTPATIENT)
Dept: NON INVASIVE DIAGNOSTICS | Age: 67
Discharge: HOME | End: 2022-10-03
Attending: NURSE PRACTITIONER
Payer: MEDICARE

## 2022-10-03 ENCOUNTER — APPOINTMENT (OUTPATIENT)
Dept: NON INVASIVE DIAGNOSTICS | Age: 67
End: 2022-10-03
Attending: INTERNAL MEDICINE
Payer: MEDICARE

## 2022-10-03 VITALS
RESPIRATION RATE: 16 BRPM | TEMPERATURE: 97.8 F | HEIGHT: 68 IN | SYSTOLIC BLOOD PRESSURE: 126 MMHG | OXYGEN SATURATION: 100 % | BODY MASS INDEX: 27.58 KG/M2 | DIASTOLIC BLOOD PRESSURE: 81 MMHG | HEART RATE: 86 BPM | WEIGHT: 182 LBS

## 2022-10-03 LAB
ECHO AO ROOT DIAM: 2.8 CM
ECHO AO ROOT INDEX: 1.43 CM/M2
ECHO AV AREA PEAK VELOCITY: 2.1 CM2
ECHO AV AREA VTI: 2.5 CM2
ECHO AV AREA/BSA PEAK VELOCITY: 1.1 CM2/M2
ECHO AV AREA/BSA VTI: 1.3 CM2/M2
ECHO AV MEAN GRADIENT: 3 MMHG
ECHO AV MEAN VELOCITY: 0.9 M/S
ECHO AV PEAK GRADIENT: 6 MMHG
ECHO AV PEAK VELOCITY: 1.2 M/S
ECHO AV VELOCITY RATIO: 0.67
ECHO AV VTI: 20.1 CM
ECHO LA DIAMETER INDEX: 1.38 CM/M2
ECHO LA DIAMETER: 2.7 CM
ECHO LA TO AORTIC ROOT RATIO: 0.96
ECHO LA VOL 4C: 35 ML (ref 18–58)
ECHO LA VOLUME INDEX A4C: 18 ML/M2 (ref 16–34)
ECHO LV E' LATERAL VELOCITY: 9 CM/S
ECHO LV E' SEPTAL VELOCITY: 5 CM/S
ECHO LV EDV A4C: 55 ML
ECHO LV EDV INDEX A4C: 28 ML/M2
ECHO LV EJECTION FRACTION A4C: 53 %
ECHO LV ESV A4C: 26 ML
ECHO LV ESV INDEX A4C: 13 ML/M2
ECHO LV FRACTIONAL SHORTENING: 16 % (ref 28–44)
ECHO LV INTERNAL DIMENSION DIASTOLE INDEX: 1.89 CM/M2
ECHO LV INTERNAL DIMENSION DIASTOLIC: 3.7 CM (ref 4.2–5.9)
ECHO LV INTERNAL DIMENSION SYSTOLIC INDEX: 1.58 CM/M2
ECHO LV INTERNAL DIMENSION SYSTOLIC: 3.1 CM
ECHO LV IVSD: 1.1 CM (ref 0.6–1)
ECHO LV MASS 2D: 156.7 G (ref 88–224)
ECHO LV MASS INDEX 2D: 80 G/M2 (ref 49–115)
ECHO LV POSTERIOR WALL DIASTOLIC: 1.4 CM (ref 0.6–1)
ECHO LV RELATIVE WALL THICKNESS RATIO: 0.76
ECHO LVOT AREA: 3.1 CM2
ECHO LVOT AV VTI INDEX: 0.84
ECHO LVOT DIAM: 2 CM
ECHO LVOT MEAN GRADIENT: 1 MMHG
ECHO LVOT PEAK GRADIENT: 3 MMHG
ECHO LVOT PEAK VELOCITY: 0.8 M/S
ECHO LVOT STROKE VOLUME INDEX: 27.1 ML/M2
ECHO LVOT SV: 53.1 ML
ECHO LVOT VTI: 16.9 CM
ECHO MV A VELOCITY: 0.74 M/S
ECHO MV AREA VTI: 2.8 CM2
ECHO MV E DECELERATION TIME (DT): 229.1 MS
ECHO MV E VELOCITY: 0.57 M/S
ECHO MV E/A RATIO: 0.77
ECHO MV E/E' LATERAL: 6.33
ECHO MV E/E' RATIO (AVERAGED): 8.87
ECHO MV E/E' SEPTAL: 11.4
ECHO MV LVOT VTI INDEX: 1.12
ECHO MV MAX VELOCITY: 0.9 M/S
ECHO MV MEAN GRADIENT: 2 MMHG
ECHO MV MEAN VELOCITY: 0.6 M/S
ECHO MV PEAK GRADIENT: 3 MMHG
ECHO MV VTI: 19 CM
ECHO PV MAX VELOCITY: 0.8 M/S
ECHO PV PEAK GRADIENT: 3 MMHG
ECHO RV INTERNAL DIMENSION: 3.3 CM
STRESS BASELINE DIAS BP: 94 MMHG
STRESS BASELINE HR: 75 BPM
STRESS BASELINE ST DEPRESSION: 0 MM
STRESS BASELINE SYS BP: 138 MMHG
STRESS ESTIMATED WORKLOAD: 1 METS
STRESS EXERCISE DUR MIN: 0 MIN
STRESS EXERCISE DUR SEC: 43 SEC
STRESS O2 SAT PEAK: 97 %
STRESS O2 SAT REST: 99 %
STRESS PEAK DIAS BP: 80 MMHG
STRESS PEAK SYS BP: 142 MMHG
STRESS PERCENT HR ACHIEVED: 68 %
STRESS POST PEAK HR: 103 BPM
STRESS RATE PRESSURE PRODUCT: NORMAL BPM*MMHG
STRESS ST DEPRESSION: 0 MM
STRESS TARGET HR: 152 BPM

## 2022-10-03 PROCEDURE — 93017 CV STRESS TEST TRACING ONLY: CPT

## 2022-10-03 PROCEDURE — G0378 HOSPITAL OBSERVATION PER HR: HCPCS

## 2022-10-03 PROCEDURE — 74011250637 HC RX REV CODE- 250/637: Performed by: INTERNAL MEDICINE

## 2022-10-03 PROCEDURE — 74011250637 HC RX REV CODE- 250/637: Performed by: NURSE PRACTITIONER

## 2022-10-03 PROCEDURE — 93306 TTE W/DOPPLER COMPLETE: CPT

## 2022-10-03 PROCEDURE — A9500 TC99M SESTAMIBI: HCPCS

## 2022-10-03 PROCEDURE — 74011250636 HC RX REV CODE- 250/636: Performed by: INTERNAL MEDICINE

## 2022-10-03 PROCEDURE — 74011000250 HC RX REV CODE- 250: Performed by: GENERAL ACUTE CARE HOSPITAL

## 2022-10-03 PROCEDURE — 74011250637 HC RX REV CODE- 250/637: Performed by: GENERAL ACUTE CARE HOSPITAL

## 2022-10-03 RX ORDER — LOSARTAN POTASSIUM 25 MG/1
50 TABLET ORAL DAILY
Status: DISCONTINUED | OUTPATIENT
Start: 2022-10-03 | End: 2022-10-03 | Stop reason: HOSPADM

## 2022-10-03 RX ORDER — TRAZODONE HYDROCHLORIDE 100 MG/1
100 TABLET ORAL
COMMUNITY

## 2022-10-03 RX ORDER — LANOLIN ALCOHOL/MO/W.PET/CERES
500 CREAM (GRAM) TOPICAL DAILY
Status: DISCONTINUED | OUTPATIENT
Start: 2022-10-03 | End: 2022-10-03 | Stop reason: HOSPADM

## 2022-10-03 RX ORDER — TETRAKIS(2-METHOXYISOBUTYLISOCYANIDE)COPPER(I) TETRAFLUOROBORATE 1 MG/ML
10.4 INJECTION, POWDER, LYOPHILIZED, FOR SOLUTION INTRAVENOUS
Status: COMPLETED | OUTPATIENT
Start: 2022-10-03 | End: 2022-10-03

## 2022-10-03 RX ORDER — QUETIAPINE FUMARATE 400 MG/1
400 TABLET, FILM COATED ORAL
COMMUNITY

## 2022-10-03 RX ORDER — LANOLIN ALCOHOL/MO/W.PET/CERES
500 CREAM (GRAM) TOPICAL DAILY
Qty: 30 TABLET | Refills: 1 | Status: SHIPPED | OUTPATIENT
Start: 2022-10-04

## 2022-10-03 RX ORDER — FLUOXETINE HYDROCHLORIDE 20 MG/1
20 CAPSULE ORAL DAILY
COMMUNITY

## 2022-10-03 RX ORDER — HALOPERIDOL 10 MG/1
TABLET ORAL 2 TIMES DAILY
COMMUNITY

## 2022-10-03 RX ORDER — TETRAKIS(2-METHOXYISOBUTYLISOCYANIDE)COPPER(I) TETRAFLUOROBORATE 1 MG/ML
31.1 INJECTION, POWDER, LYOPHILIZED, FOR SOLUTION INTRAVENOUS
Status: COMPLETED | OUTPATIENT
Start: 2022-10-03 | End: 2022-10-03

## 2022-10-03 RX ADMIN — BUSPIRONE HYDROCHLORIDE 10 MG: 10 TABLET ORAL at 11:48

## 2022-10-03 RX ADMIN — Medication 500 MCG: at 11:48

## 2022-10-03 RX ADMIN — TETRAKIS(2-METHOXYISOBUTYLISOCYANIDE)COPPER(I) TETRAFLUOROBORATE 10.4 MILLICURIE: 1 INJECTION, POWDER, LYOPHILIZED, FOR SOLUTION INTRAVENOUS at 08:00

## 2022-10-03 RX ADMIN — SODIUM CHLORIDE, PRESERVATIVE FREE 10 ML: 5 INJECTION INTRAVENOUS at 06:04

## 2022-10-03 RX ADMIN — AMLODIPINE BESYLATE 2.5 MG: 2.5 TABLET ORAL at 11:48

## 2022-10-03 RX ADMIN — REGADENOSON 0.4 MG: 0.08 INJECTION, SOLUTION INTRAVENOUS at 10:10

## 2022-10-03 RX ADMIN — ARIPIPRAZOLE 5 MG: 5 TABLET ORAL at 15:25

## 2022-10-03 RX ADMIN — LOSARTAN POTASSIUM 50 MG: 25 TABLET, FILM COATED ORAL at 11:48

## 2022-10-03 RX ADMIN — TETRAKIS(2-METHOXYISOBUTYLISOCYANIDE)COPPER(I) TETRAFLUOROBORATE 31.1 MILLICURIE: 1 INJECTION, POWDER, LYOPHILIZED, FOR SOLUTION INTRAVENOUS at 10:10

## 2022-10-03 RX ADMIN — FLUOXETINE 40 MG: 20 CAPSULE ORAL at 11:47

## 2022-10-03 NOTE — PROGRESS NOTES
End of Shift Note        Shift worked:  [id number]     Shift summary and any significant changes:    NPO after midnight for Stress test 10/3     Concerns for physician to address:      Zone phone for oncoming shift:   [phone]       Activity:  Activity Level: Up ad sawyer  Number times ambulated in hallways past shift: 0  Number of times OOB to chair past shift: 3    Cardiac:   Cardiac Monitoring: Yes      Cardiac Rhythm: Sinus Rhythm    Access:  Current line(s): PIV     Genitourinary:   Urinary status: voiding    Respiratory:   O2 Device: None (Room air)  Chronic home O2 use?: N/A  Incentive spirometer at bedside: N/A       GI:  Last Bowel Movement Date: 09/29/22  Current diet:  DIET NPO  Passing flatus: NO  Tolerating current diet: NO       Pain Management:   Patient states pain is manageable on current regimen: N/A    Skin:  Dani Score: 23  Interventions: float heels and PT/OT consult    Patient Safety:  Fall Score:  Total Score: 2  Interventions: pt to call before getting OOB       Length of Stay:  Expected LOS: - - -  Actual LOS: 2      Arron Lawson RN

## 2022-10-03 NOTE — DISCHARGE INSTRUCTIONS
Patient Discharge Instructions    Alvarez Perry / [id number] : 1955    Admitted 2022 Discharged: 10/3/2022         DISCHARGE DIAGNOSIS:   Syncope  Leukocytosis  Bipolar disorder  Depression        Take Home Medications     {Medication reconciliation information is now added to the patient's AVS automatically when it is printed. There is no need to use this SmartLink in discharge instructions. Highlight this text and delete it to clear this message}      General drug facts     If you have a very bad allergy, wear an allergy ID at all times. It is important that you take the medication exactly as they are prescribed. Keep your medication in the bottles provided by the pharmacist.  Keep a list of all your drugs (prescription, natural products, vitamins, OTC) with you. Give this list to your doctor. Do not take other medications without consulting your doctor. Do not share your drugs with others and do not take anyone else's drugs. Keep all drugs out of the reach of children and pets. Most drugs may be thrown away in household trash after mixing with coffee grounds or osman litter and sealing in a plastic bag. Keep a list Call your doctor for help with any side effects. If in the [place] you may also call the FDA at 1-905-RKV-1206    Talk with the doctor before starting any new drug, including OTC, natural products, or vitamins. What to do at Home    1. Recommended diet: Regular    2. Recommended activity: Activity as tolerated    3. If you experience any of the following symptoms then please call your primary care physician or return to the emergency room if you cannot get hold of your doctor:    4. Wound Care: None    5. Lab work: CBC and CMP in 1 week    6. Follow-up with primary psychiatrist as scheduled    7 . Bring these papers with you to your follow up appointments.  The papers will help your doctors be sure to continue the care plan from the hospital.      If you have questions regarding the hospital related prescriptions or hospital related issues please call [facility] at [phone]. You can always direct your questions to your primary care doctor if you are unable to reach your hospital physician; your PCP works as an extension of your hospital doctor just like your hospital doctor is an extension of your PCP for your time at the hospital [facility], [facility])      Follow-up with:   PCP: @PCP@  Michael Moore, [address] [place] [address]    Schedule an appointment as soon as possible for a visit in 1 week(s)  for PCP post hospital follow up appt. Please call for your own appointment        Information obtained by :  I understand that if any problems occur once I am at home I am to contact my physician. I understand and acknowledge receipt of the instructions indicated above.                                                                                                                                            Physician's or R.N.'s Signature                                                                  Date/Time                                                                                                                                              Patient or Representative Signature                                                          Date/Time

## 2022-10-03 NOTE — PROGRESS NOTES
Transition of Care Plan:     RUR: OBS. Disposition: Plan Home  Follow up appointments: PCP: Tao: CM attempted to make appointment - LM to contact pt with follow up appointment. DME needed: n/a   Transportation at Discharge: Sister to provide  transport. Bonnye Persons or means to access home:    yes     Medicare Letter: n/a OBS  Is patient a  and connected with the [place]? no               If yes, was [employer] transfer form completed and [facility] notified? Caregiver Contact: sister in law: Ivon Poster: [phone]  Discharge Caregiver contacted prior to discharge? Pt is alert and oriented and preferred to update family on his own. Care Conference needed?:               no     Pt cleared for d/c from CM standpoint. CM discussed d/c plan with pt, Pt agreed. CM attempted to make follow up appointment. No answer. LM for office to contact pt with follow up appointment. No further CM needs at this time. Care Management Interventions  PCP Verified by CM: Yes  Palliative Care Criteria Met (RRAT>21 & CHF Dx)?: No  Mode of Transport at Discharge:  Other (see comment) (Family to provide)  Transition of Care Consult (CM Consult): Discharge Planning  MyChart Signup: No  Discharge Durable Medical Equipment: No  Physical Therapy Consult: No  Occupational Therapy Consult: No  Support Systems: Other Family Member(s), Friend/Neighbor, Religion/[place]  Confirm Follow Up Transport: Family  Discharge Location  Patient Expects to be Discharged to[de-identified] [facility] [address]  Phone: [phone]

## 2022-10-03 NOTE — PROGRESS NOTES
I called patient's medical POA and explained hospital course and also discharge instructions and she verbalized understanding.

## 2022-10-03 NOTE — PROGRESS NOTES
NUC MED: Same Day LEXISCAN Cardiac Stress completed at 11:30 am. Please check with ordering Cardiologist regarding  Pt diet.

## 2022-10-03 NOTE — PROGRESS NOTES
[facility]  Oscar Acuna [address] [place] [address]  [phone]  [email or web address]         Cardiology Progress Note      10/3/2022 8:07 AM    Admit Date: 9/30/2022    Admit Diagnosis:   Syncope and collapse [R55]    Interval History/Subjective:     Nolberto Gerardo has no complaints. Visit Vitals  BP (!) 129/95 (BP 1 Location: Left upper arm, BP Patient Position: At rest)   Pulse 85   Temp 98.3 °F (36.8 °C)   Resp 17   Ht 5' 8\" (1.727 m)   Wt 82.6 kg (182 lb)   SpO2 94%   BMI 27.67 kg/m²       Current Facility-Administered Medications   Medication Dose Route Frequency    hydrALAZINE (APRESOLINE) 20 mg/mL injection 10 mg  10 mg IntraVENous Q6H PRN    lactulose (CHRONULAC) 10 gram/15 mL solution 15 mL  10 g Oral BID PRN    amLODIPine (NORVASC) tablet 2.5 mg  2.5 mg Oral DAILY    ARIPiprazole (ABILIFY) tablet 5 mg  5 mg Oral DAILY    busPIRone (BUSPAR) tablet 10 mg  10 mg Oral TID    FLUoxetine (PROzac) capsule 40 mg  40 mg Oral DAILY    mirtazapine (REMERON) tablet 15 mg  15 mg Oral QHS    sodium chloride (NS) flush 5-40 mL  5-40 mL IntraVENous Q8H    sodium chloride (NS) flush 5-40 mL  5-40 mL IntraVENous PRN    acetaminophen (TYLENOL) tablet 650 mg  650 mg Oral Q6H PRN    Or    acetaminophen (TYLENOL) suppository 650 mg  650 mg Rectal Q6H PRN    polyethylene glycol (MIRALAX) packet 17 g  17 g Oral DAILY PRN    ondansetron (ZOFRAN ODT) tablet 4 mg  4 mg Oral Q8H PRN    Or    ondansetron (ZOFRAN) injection 4 mg  4 mg IntraVENous Q6H PRN    enoxaparin (LOVENOX) injection 40 mg  40 mg SubCUTAneous DAILY    nicotine (NICODERM CQ) 14 mg/24 hr patch 1 Patch  1 Patch TransDERmal DAILY       Objective:      Physical Exam:  General: Well developed, in no acute distress, cooperative and alert  HEENT: No carotid bruits, PEERL, EOM intact.   Heart:  reg rate and rhythm; normal S1/S2; no murmurs  Respiratory: Clear bilaterally x 4, no wheezing or rales  Abdomen:   Soft, non-tender, no distention, no masses. + BS. Extremities:  Normal cap refill, no cyanosis, atraumatic. No edema. Neuro: A&Ox3, speech clear  Skin: Skin color is normal. Non diaphoretic  Vascular: 2+ pulses symmetric in all extremities    Data Review:   Recent Labs     10/02/22  0316 10/01/22  0141 09/30/22  1107   WBC 9.2 15.4* 15.9*   HGB 13.7 14.0 14.5   HCT 40.6 42.6 43.9    255 287     Recent Labs     10/02/22  0316 10/01/22  0141 09/30/22  1107    137 133*   K 4.9 4.0 4.7    106 103   CO2 27 23 22   GLU 97 126* 174*   BUN 7 13 16   CREA 0.65* 0.78 1.20   CA 8.8 9.0 8.8   MG  --  2.2  --    PHOS 3.7  --   --    ALB 2.9*  --  3.5   TBILI  --   --  0.5   ALT  --   --  65       No results for input(s): TROIQ, CPK, CKMB in the last 72 hours. No intake or output data in the 24 hours ending 10/03/22 0807     Telemetry: sinus rhythm    Echocardiogram: pending    Radiology Results in the last 24 hours:  No results found. Assessment:     Active Problems:    Syncope and collapse (9/30/2022)        Plan:     Syncope  Unknown etiology -- + UDS but pt unaware of recent opiate use  Echo and lexiscan stress test today  No events on tele overnight  If echo/marion normal, then plan for ILR as outpatient    2. Atypical chest pain  EKG normal, neg trops  For marion today    3.   HTN  BP improved with addition of amlodipine  Resume PTA losartan    Michaela Hernandez NP

## 2022-10-03 NOTE — PROGRESS NOTES
Attempted to schedule hospital follow up PCP appointment. Unable to reach anyone, left voicemail. Pending patient discharge.  Latanya Rehman, Care Management Assistant

## 2022-10-03 NOTE — PROGRESS NOTES
Lexiscan stress test completed:  Impression:   Prominent diaphragmatic attenuation. No convincing evidence of myocardial ischemia or infarction. Left ventricular ejection fraction is 69 %. Echo done:    Left Ventricle: Low normal left ventricular systolic function with a visually estimated EF of 50 - 55%. Left ventricle size is normal. Normal wall thickness. Normal wall motion. Normal diastolic function. No further recommendations from cardiac standpoint. Will sign off. He will follow up in our office within 1-2 weeks for ILR implant. Will have our office arrange FU appt. Rudy Seay NP  10/3/2022  1:25 PM    [facility]  [address] [place] [address]  [phone]  www. [facility]

## 2022-10-12 PROBLEM — F32.0 CURRENT MILD EPISODE OF MAJOR DEPRESSIVE DISORDER WITHOUT PRIOR EPISODE (HCC): Status: ACTIVE | Noted: 2022-10-12

## 2022-12-15 NOTE — DISCHARGE SUMMARY
Hospitalist Discharge Summary     Patient ID:  Mike Stovall  [id number]  45 y.o.  1955 9/30/2022    PCP on record: Ricci Charlton MD    Admit date: 9/30/2022  Discharge date and time: 10/3/2022    DISCHARGE DIAGNOSIS:    Syncope  Leukocytosis  Bipolar disorder  Depression    CONSULTATIONS:  IP CONSULT TO CARDIOLOGY    Excerpted HPI from H&P of Ronnie Luna MD:  Mike Stovall, 75 y.o. male presents to the ED with cc of syncopal episode this AM. Patient reports that he was at work this morning, smoked a cigarette and then started feeling bad and started having substernal chest pain and does not remember what happened afterwards till he woke up in the ambulance. on EMS arrival they state that he had a syncopal episode and passed out. Ems states that he did not hit his head because they were able to catch him. They state that they then started bagging patient as he had no gag reflex, they administered narcan with no effect then administered 4mg intranasal narcan with improvement after 5-10 minutes. Pt reports having 2 chest pain episodes in the past 6 months, but no prior syncope. And he though these chest pain episodes are related to his anxiety. Pt denies tongue bite, loss of control of bowel/bladder, HA, focal weakness/numbness. Denies LOC, h/o seizures, fever, change in bowel/bladder habits, SOB. Pt denies alcohol abuse. He is denying any ingestions. He states he has a remote history of cocaine abuse but denies any recently. He smokes marijuana, not that frequently. Patient denying any complaints. He has long psychiatric history but states he has bene taking his medications as prescribed, he did report that 2 of his med doses were changed 2 weeks ago.     ______________________________________________________________________  DISCHARGE SUMMARY/HOSPITAL COURSE:  for full details see H&P, daily progress notes, labs, consult notes.        Patient was admitted hospital and noted to have syncope. Cardiology was consulted. Patient had an echocardiogram which was within normal limits. Stress test was normal.  Telemetry was normal.  Cardiology recommended placement of a implantable loop recorder on outpatient basis. Patient's rest of the medical problems remained stable during hospitalization. Patient was cleared by cardiology to be discharged for outpatient follow-up.      _______________________________________________________________________  Patient seen and examined by me on discharge day. Pertinent Findings:  Gen:    Not in distress  Chest: Clear lungs  CVS:   Regular rhythm. No edema  Abd:  Soft, not distended, not tender  Neuro:  Alert,   _______________________________________________________________________  DISCHARGE MEDICATIONS:   Discharge Medication List as of 10/3/2022  5:00 PM        START taking these medications    Details   cyanocobalamin (VITAMIN B12) 500 mcg tablet Take 1 Tablet by mouth daily. , Normal, Disp-30 Tablet, R-1           CONTINUE these medications which have NOT CHANGED    Details   haloperidoL (HALDOL) 10 mg tablet Take  by mouth two (2) times a day. 10 mg in the AM and 20 mg in the PM, Historical Med      FLUoxetine (PROzac) 20 mg capsule Take 20 mg by mouth daily. , Historical Med      QUEtiapine (SEROqueL) 400 mg tablet Take 400 mg by mouth nightly., Historical Med      traZODone (DESYREL) 100 mg tablet Take 100 mg by mouth nightly., Historical Med      mometasone (ELOCON) 0.1 % ointment Apply  to affected area daily. Apply to affected area on body daily for maximum of 2 weeks, Normal, Disp-45 g, R-0      losartan (COZAAR) 50 mg tablet Take 1 Tablet by mouth daily. , Normal, Disp-90 Tablet, R-4      triamcinolone acetonide (KENALOG) 0.1 % topical cream Apply  to affected area two (2) times a day.  Apply to affected area on face twice daily for maximum of 2 weeks, Normal, Disp-15 g, R-0      amLODIPine (NORVASC) 2.5 mg tablet TAKE 1 TABLET EVERY DAY, Normal, Disp-90 Tablet, R-3      mirtazapine (REMERON) 15 mg tablet Take 1 Tab by mouth nightly., Normal, Disp-90 Tab, R-3      sildenafil citrate (VIAGRA) 50 mg tablet Take 1 Tab by mouth as needed (prn impotence). , Normal, Disp-15 Tab, R-3           STOP taking these medications       busPIRone (BUSPAR) 10 mg tablet Comments:   Reason for Stopping:         ARIPiprazole (ABILIFY) 5 mg tablet Comments:   Reason for Stopping:                 Patient Follow Up Instructions:     1. Recommended diet: Regular    2. Recommended activity: Activity as tolerated    3. If you experience any of the following symptoms then please call your primary care physician or return to the emergency room if you cannot get hold of your doctor:    4. Wound Care: None    5. Lab work: CBC and CMP in 1 week    6. Follow-up with primary psychiatrist as scheduled    7 . Bring these papers with you to your follow up appointments. The papers will help your doctors be sure to continue the care plan from the hospital.      Follow-up Information       Follow up With Specialties Details Why Asiya Bautista MD Family Medicine Schedule an appointment as soon as possible for a visit in 1 week(s) PCP post hospital follow up appt. The office will contact you to schedule your hospital follow up appointment.  Call the office in 1- 2 days if they have not contacted you by 10/5/22. 13 [facility]      [facility], [address] [facility] Vascular Surgery, Cardiovascular Disease Physician, Interventional Cardiology Physician Schedule an appointment as soon as possible for a visit in 1 week(s)  Ella Marquez [phone]            ________________________________________________________________    Risk of deterioration: High    Condition at Discharge:  Stable  __________________________________________________________________    Disposition  Home with family, no needs    ____________________________________________________________________    Code Status: Full Code  ___________________________________________________________________      Total time in minutes spent coordinating this discharge (includes going over instructions, follow-up, prescriptions, and preparing report for sign off to her PCP) :  >30 minutes    Signed:  Marco Antonio Harp MD